# Patient Record
Sex: FEMALE | Race: WHITE | NOT HISPANIC OR LATINO | Employment: UNEMPLOYED | URBAN - METROPOLITAN AREA
[De-identification: names, ages, dates, MRNs, and addresses within clinical notes are randomized per-mention and may not be internally consistent; named-entity substitution may affect disease eponyms.]

---

## 2017-08-07 ENCOUNTER — DOCUMENTATION ONLY (OUTPATIENT)
Dept: HEMATOLOGY/ONCOLOGY | Facility: CLINIC | Age: 22
End: 2017-08-07

## 2017-08-07 NOTE — PROGRESS NOTES
The following pt's appointment has been rescheduled.  Attempted to contact the following pt by phone.  No answer.  Formerly Dr. Delaney's pt. ap

## 2017-08-09 ENCOUNTER — OFFICE VISIT (OUTPATIENT)
Dept: OBSTETRICS AND GYNECOLOGY | Facility: CLINIC | Age: 22
End: 2017-08-09
Payer: MEDICAID

## 2017-08-09 ENCOUNTER — LAB VISIT (OUTPATIENT)
Dept: LAB | Facility: HOSPITAL | Age: 22
End: 2017-08-09
Attending: ADVANCED PRACTICE MIDWIFE
Payer: MEDICAID

## 2017-08-09 VITALS
SYSTOLIC BLOOD PRESSURE: 116 MMHG | DIASTOLIC BLOOD PRESSURE: 70 MMHG | BODY MASS INDEX: 36.57 KG/M2 | HEIGHT: 63 IN | WEIGHT: 206.38 LBS

## 2017-08-09 DIAGNOSIS — Z34.80 SUPERVISION OF OTHER NORMAL PREGNANCY: ICD-10-CM

## 2017-08-09 DIAGNOSIS — I78.0 OSLER-WEBER-RENDU DISEASE: ICD-10-CM

## 2017-08-09 DIAGNOSIS — E66.9 NON MORBID OBESITY, UNSPECIFIED OBESITY TYPE: ICD-10-CM

## 2017-08-09 DIAGNOSIS — Z34.80 SUPERVISION OF OTHER NORMAL PREGNANCY: Primary | ICD-10-CM

## 2017-08-09 LAB
ABO + RH BLD: NORMAL
BASOPHILS # BLD AUTO: 0.02 K/UL
BASOPHILS NFR BLD: 0.2 %
BLD GP AB SCN CELLS X3 SERPL QL: NORMAL
DIFFERENTIAL METHOD: NORMAL
EOSINOPHIL # BLD AUTO: 0.1 K/UL
EOSINOPHIL NFR BLD: 1.3 %
ERYTHROCYTE [DISTWIDTH] IN BLOOD BY AUTOMATED COUNT: 12.8 %
HCT VFR BLD AUTO: 40 %
HGB BLD-MCNC: 13.9 G/DL
LYMPHOCYTES # BLD AUTO: 2.8 K/UL
LYMPHOCYTES NFR BLD: 25.5 %
MCH RBC QN AUTO: 30.9 PG
MCHC RBC AUTO-ENTMCNC: 34.8 G/DL
MCV RBC AUTO: 89 FL
MONOCYTES # BLD AUTO: 0.7 K/UL
MONOCYTES NFR BLD: 6 %
NEUTROPHILS # BLD AUTO: 7.4 K/UL
NEUTROPHILS NFR BLD: 66.8 %
PLATELET # BLD AUTO: 322 K/UL
PMV BLD AUTO: 11.1 FL
RBC # BLD AUTO: 4.5 M/UL
WBC # BLD AUTO: 11.01 K/UL

## 2017-08-09 PROCEDURE — 99999 PR PBB SHADOW E&M-EST. PATIENT-LVL II: CPT | Mod: PBBFAC,,, | Performed by: ADVANCED PRACTICE MIDWIFE

## 2017-08-09 PROCEDURE — 3008F BODY MASS INDEX DOCD: CPT | Mod: ,,, | Performed by: ADVANCED PRACTICE MIDWIFE

## 2017-08-09 PROCEDURE — 86703 HIV-1/HIV-2 1 RESULT ANTBDY: CPT

## 2017-08-09 PROCEDURE — 86762 RUBELLA ANTIBODY: CPT

## 2017-08-09 PROCEDURE — 87340 HEPATITIS B SURFACE AG IA: CPT

## 2017-08-09 PROCEDURE — 86850 RBC ANTIBODY SCREEN: CPT

## 2017-08-09 PROCEDURE — 99203 OFFICE O/P NEW LOW 30 MIN: CPT | Mod: S$PBB,TH,, | Performed by: ADVANCED PRACTICE MIDWIFE

## 2017-08-09 PROCEDURE — 86900 BLOOD TYPING SEROLOGIC ABO: CPT

## 2017-08-09 PROCEDURE — 85025 COMPLETE CBC W/AUTO DIFF WBC: CPT

## 2017-08-09 PROCEDURE — 36415 COLL VENOUS BLD VENIPUNCTURE: CPT

## 2017-08-09 PROCEDURE — 86592 SYPHILIS TEST NON-TREP QUAL: CPT

## 2017-08-09 NOTE — PROGRESS NOTES
Subjective:       Patient ID: Angy Bay is a 21 y.o. female.    Chief Complaint:  Possible Pregnancy      History of Present Illness  HPI  Missed Menses/ Possible Pregnancy  Patient complains of amenorrhea. She believes she could be pregnant. Pregnancy is desired. Sexual Activity: single partner, contraception: none. Current symptoms also include: breast tenderness, morning sickness, nausea and positive home pregnancy test. Last period was normal.     Patient's last menstrual period was 2017.                       GYN & OB HistoryPatient's last menstrual period was 2017.   Date of Last Pap: 2016    OB History    Para Term  AB Living   1 1 1     1   SAB TAB Ectopic Multiple Live Births         0 1      # Outcome Date GA Lbr Sherif/2nd Weight Sex Delivery Anes PTL Lv   1 Term 16 41w0d  3.035 kg (6 lb 11.1 oz) F Vag-Vacuum EPI N JUAN CARLOS      Complications: Oligohydramnios          Review of Systems  Review of Systems   Gastrointestinal: Positive for nausea.   All other systems reviewed and are negative.          Objective:    Physical Exam:   Constitutional: She is oriented to person, place, and time. She appears well-developed and well-nourished.    HENT:   Head: Normocephalic.      Cardiovascular: Normal rate and regular rhythm.     Pulmonary/Chest: Effort normal and breath sounds normal.        Abdominal: Soft. Bowel sounds are normal.   Non-tender     Genitourinary: Vagina normal and uterus normal.               Neurological: She is alert and oriented to person, place, and time.    Skin: Skin is warm and dry.    Psychiatric: She has a normal mood and affect. Her behavior is normal. Judgment and thought content normal.     Speculum for gen probe done; last pap , WNL  Pelvic WNL     Assessment:        1. Osler-Weber-Rendu disease    2. Non morbid obesity, unspecified obesity type       + pregnancy test         Plan:      F/u for NOB exam   Labs ordered  U/s for dating  ordered

## 2017-08-10 LAB
BACTERIA UR CULT: NO GROWTH
C TRACH DNA SPEC QL NAA+PROBE: NOT DETECTED
HBV SURFACE AG SERPL QL IA: NEGATIVE
HIV 1+2 AB+HIV1 P24 AG SERPL QL IA: NEGATIVE
N GONORRHOEA DNA SPEC QL NAA+PROBE: NOT DETECTED
RPR SER QL: NORMAL
RUBV IGG SER-ACNC: 33.6 IU/ML
RUBV IGG SER-IMP: REACTIVE

## 2017-08-28 ENCOUNTER — INITIAL PRENATAL (OUTPATIENT)
Dept: OBSTETRICS AND GYNECOLOGY | Facility: CLINIC | Age: 22
End: 2017-08-28
Payer: MEDICAID

## 2017-08-28 ENCOUNTER — PROCEDURE VISIT (OUTPATIENT)
Dept: OBSTETRICS AND GYNECOLOGY | Facility: CLINIC | Age: 22
End: 2017-08-28
Payer: MEDICAID

## 2017-08-28 VITALS
BODY MASS INDEX: 36.18 KG/M2 | DIASTOLIC BLOOD PRESSURE: 70 MMHG | SYSTOLIC BLOOD PRESSURE: 100 MMHG | WEIGHT: 204.25 LBS

## 2017-08-28 DIAGNOSIS — Z34.80 SUPERVISION OF OTHER NORMAL PREGNANCY: ICD-10-CM

## 2017-08-28 DIAGNOSIS — Z34.91 ENCOUNTER FOR SUPERVISION OF NORMAL PREGNANCY IN FIRST TRIMESTER, UNSPECIFIED GRAVIDITY: Primary | ICD-10-CM

## 2017-08-28 PROCEDURE — 76801 OB US < 14 WKS SINGLE FETUS: CPT | Mod: PBBFAC,PO | Performed by: OBSTETRICS & GYNECOLOGY

## 2017-08-28 PROCEDURE — 99999 PR PBB SHADOW E&M-EST. PATIENT-LVL II: CPT | Mod: PBBFAC,,, | Performed by: ADVANCED PRACTICE MIDWIFE

## 2017-08-28 PROCEDURE — 3008F BODY MASS INDEX DOCD: CPT | Mod: ,,, | Performed by: ADVANCED PRACTICE MIDWIFE

## 2017-08-28 PROCEDURE — 99212 OFFICE O/P EST SF 10 MIN: CPT | Mod: PBBFAC,PO | Performed by: ADVANCED PRACTICE MIDWIFE

## 2017-08-28 PROCEDURE — 76801 OB US < 14 WKS SINGLE FETUS: CPT | Mod: 26,S$PBB,, | Performed by: OBSTETRICS & GYNECOLOGY

## 2017-08-28 PROCEDURE — 99214 OFFICE O/P EST MOD 30 MIN: CPT | Mod: TH,S$PBB,, | Performed by: ADVANCED PRACTICE MIDWIFE

## 2017-08-28 NOTE — PROGRESS NOTES
Pt here for new ob visit  Reoriented to the practice including MICHI/MD collaboration  Reviewed labs  Introduced to Coffective bowen  Blue bag materials reviewed  Warning signs discussed.

## 2017-10-10 ENCOUNTER — ROUTINE PRENATAL (OUTPATIENT)
Dept: OBSTETRICS AND GYNECOLOGY | Facility: CLINIC | Age: 22
End: 2017-10-10
Payer: MEDICAID

## 2017-10-10 VITALS
DIASTOLIC BLOOD PRESSURE: 70 MMHG | WEIGHT: 206.81 LBS | SYSTOLIC BLOOD PRESSURE: 122 MMHG | BODY MASS INDEX: 36.63 KG/M2

## 2017-10-10 DIAGNOSIS — Z3A.16 16 WEEKS GESTATION OF PREGNANCY: ICD-10-CM

## 2017-10-10 DIAGNOSIS — Z36.3 ANTENATAL SCREENING FOR MALFORMATION USING ULTRASONICS: ICD-10-CM

## 2017-10-10 DIAGNOSIS — O99.212 OBESITY DURING PREGNANCY IN SECOND TRIMESTER: Primary | ICD-10-CM

## 2017-10-10 DIAGNOSIS — O21.9 NAUSEA AND VOMITING IN PREGNANCY PRIOR TO 22 WEEKS GESTATION: ICD-10-CM

## 2017-10-10 DIAGNOSIS — I78.0 OSLER-WEBER-RENDU DISEASE: ICD-10-CM

## 2017-10-10 PROCEDURE — 99999 PR PBB SHADOW E&M-EST. PATIENT-LVL II: CPT | Mod: PBBFAC,,, | Performed by: MIDWIFE

## 2017-10-10 PROCEDURE — 99212 OFFICE O/P EST SF 10 MIN: CPT | Mod: PBBFAC | Performed by: MIDWIFE

## 2017-10-10 PROCEDURE — 99212 OFFICE O/P EST SF 10 MIN: CPT | Mod: TH,S$PBB,, | Performed by: MIDWIFE

## 2017-10-10 RX ORDER — ONDANSETRON 8 MG/1
8 TABLET, ORALLY DISINTEGRATING ORAL EVERY 12 HOURS PRN
Qty: 10 TABLET | Refills: 0 | Status: SHIPPED | OUTPATIENT
Start: 2017-10-10 | End: 2018-01-24

## 2017-10-10 NOTE — PROGRESS NOTES
Complaints today: nausea and vomiting    /70   Wt 93.8 kg (206 lb 12.7 oz)   LMP 2017   BMI 36.63 kg/m²     21 y.o., at 16w4d by Estimated Date of Delivery: 3/23/18  Patient Active Problem List   Diagnosis    Obesity    Osler-Weber-Rendu disease    Obesity during pregnancy in second trimester     OB History    Para Term  AB Living   2 1 1     1   SAB TAB Ectopic Multiple Live Births         0 1      # Outcome Date GA Lbr Sherif/2nd Weight Sex Delivery Anes PTL Lv   2 Current            1 Term 16 41w0d  3.035 kg (6 lb 11.1 oz) F Vag-Vacuum EPI N JUAN CARLOS      Complications: Oligohydramnios          Dating reviewed    Allergies and problem list reviewed and updated    Medical and surgical history reviewed    Prenatal labs reviewed and updated    Physical Exam:  ABD: soft, gravid, nontender, obese    Assessment:  Obesity in pregnancy  Osler Hammond Rendu disease    Plan:   US for anatomy at [facility]  Zofran for nausea   follow up 4 Weeks

## 2017-11-04 ENCOUNTER — NURSE TRIAGE (OUTPATIENT)
Dept: ADMINISTRATIVE | Facility: CLINIC | Age: 22
End: 2017-11-04

## 2017-11-04 NOTE — TELEPHONE ENCOUNTER
"17 wks preg   VM not set up at 1158am    Reason for Disposition   Patient sounds very sick or weak to the triager    Answer Assessment - Initial Assessment Questions  1. LOCATION: "Where does it hurt?"      Sx started 10/31 now with HA, ST, chest hurting- hurts to take deep breath, afeb   2. ONSET: "When did the headache start?" (Minutes, hours or days)      10/31  3. PATTERN: "Does the pain come and go, or has it been constant since it started?"      Today is constant   4. SEVERITY: "How bad is the pain?" and "What does it keep you from doing?"     - MILD - doesn't interfere with normal activities     - MODERATE - interferes with normal activities or awakens from sleep     - SEVERE - excruciating pain, unable to do any normal activities        6   5. RECURRENT SYMPTOM: "Have you ever had headaches before?" If so, ask: "When was the last time?" and "What happened that time?"      N/a   6. CAUSE: "What do you think is causing the headache?"     Cold sx   7. MIGRAINE: "Have you been diagnosed with migraine headaches?" If so, ask: "Is this headache similar?"    hx HA   8. HEAD INJURY: "Has there been any recent injury to the head?"      No   9. OTHER SYMPTOMS: "Do you have any other symptoms?" (e.g., fever, stiff neck, blurred vision; swelling of hands, face, or feet)     Glands in neck swollen   10. PREGNANCY: "How many weeks pregnant are you?"       17 wks   11. TORSTEN: "What date are you expecting to deliver?"       3/23/2018    Protocols used:  PREGNANCY - HEADACHE-A-AH  rec ED due to pain with deep breath, HA, ST. Call back with questions.     "

## 2017-11-06 ENCOUNTER — TELEPHONE (OUTPATIENT)
Dept: OBSTETRICS AND GYNECOLOGY | Facility: CLINIC | Age: 22
End: 2017-11-06

## 2017-11-07 NOTE — TELEPHONE ENCOUNTER
Spoke to patient and she stated that she went to doctor and had the flu.  Medication given and patient feeling much better.

## 2017-11-13 ENCOUNTER — PROCEDURE VISIT (OUTPATIENT)
Dept: OBSTETRICS AND GYNECOLOGY | Facility: CLINIC | Age: 22
End: 2017-11-13
Payer: MEDICAID

## 2017-11-13 ENCOUNTER — ROUTINE PRENATAL (OUTPATIENT)
Dept: OBSTETRICS AND GYNECOLOGY | Facility: CLINIC | Age: 22
End: 2017-11-13
Payer: MEDICAID

## 2017-11-13 VITALS
DIASTOLIC BLOOD PRESSURE: 72 MMHG | WEIGHT: 208.13 LBS | SYSTOLIC BLOOD PRESSURE: 116 MMHG | BODY MASS INDEX: 36.87 KG/M2

## 2017-11-13 DIAGNOSIS — Z34.82 ENCOUNTER FOR SUPERVISION OF OTHER NORMAL PREGNANCY IN SECOND TRIMESTER: ICD-10-CM

## 2017-11-13 DIAGNOSIS — Z36.3 ANTENATAL SCREENING FOR MALFORMATION USING ULTRASONICS: ICD-10-CM

## 2017-11-13 PROCEDURE — 76805 OB US >/= 14 WKS SNGL FETUS: CPT | Mod: PBBFAC,PO | Performed by: OBSTETRICS & GYNECOLOGY

## 2017-11-13 PROCEDURE — 99213 OFFICE O/P EST LOW 20 MIN: CPT | Mod: TH,S$PBB,, | Performed by: ADVANCED PRACTICE MIDWIFE

## 2017-11-13 PROCEDURE — 99212 OFFICE O/P EST SF 10 MIN: CPT | Mod: PBBFAC,PO | Performed by: ADVANCED PRACTICE MIDWIFE

## 2017-11-13 PROCEDURE — 99999 PR PBB SHADOW E&M-EST. PATIENT-LVL II: CPT | Mod: PBBFAC,,, | Performed by: ADVANCED PRACTICE MIDWIFE

## 2017-11-13 PROCEDURE — 76805 OB US >/= 14 WKS SNGL FETUS: CPT | Mod: 26,S$PBB,, | Performed by: OBSTETRICS & GYNECOLOGY

## 2017-11-13 NOTE — PROGRESS NOTES
Doing well  Anatomy ultrasound today, suboptimal  Questions answered    Coffective counseling sheet Get Ready discussed with mother. Reinforced avoiding induction of labor unless medically indicated as well as comfort measures during labor.  Encouraged mother to download Coffective mobile bowen if she has not already done so. Mother verbalizes understanding.

## 2017-11-14 ENCOUNTER — TELEPHONE (OUTPATIENT)
Dept: OBSTETRICS AND GYNECOLOGY | Facility: CLINIC | Age: 22
End: 2017-11-14

## 2017-11-14 DIAGNOSIS — I78.0 OSLER-WEBER-RENDU DISEASE: Primary | ICD-10-CM

## 2017-11-14 NOTE — TELEPHONE ENCOUNTER
Pt will need to see MFM secondary to Osler Hammond Rendu disease. Please schedule and notify pt (Routing comment)    Patient notified and appointment scheduled.

## 2017-12-06 ENCOUNTER — OFFICE VISIT (OUTPATIENT)
Dept: OBSTETRICS AND GYNECOLOGY | Facility: CLINIC | Age: 22
End: 2017-12-06
Payer: MEDICAID

## 2017-12-06 DIAGNOSIS — I78.0 OSLER-WEBER-RENDU DISEASE: ICD-10-CM

## 2017-12-06 PROCEDURE — 99213 OFFICE O/P EST LOW 20 MIN: CPT | Mod: GT,S$PBB,25,TH | Performed by: OBSTETRICS & GYNECOLOGY

## 2017-12-06 PROCEDURE — 76811 OB US DETAILED SNGL FETUS: CPT | Mod: 26,S$PBB,, | Performed by: OBSTETRICS & GYNECOLOGY

## 2017-12-06 PROCEDURE — 76811 OB US DETAILED SNGL FETUS: CPT | Mod: PBBFAC,PO | Performed by: OBSTETRICS & GYNECOLOGY

## 2017-12-06 NOTE — PROGRESS NOTES
Chief complaint: HHT- Osler-Hammond Rendu syndrome    21 y.o. [id number] 24w5d EGA.    PMH:  Past Medical History:   Diagnosis Date    Osler-Weber-Rendu disease        PObHx:  OB History    Para Term  AB Living   2 1 1     1   SAB TAB Ectopic Multiple Live Births         0 1      # Outcome Date GA Lbr Sherif/2nd Weight Sex Delivery Anes PTL Lv   2 Current            1 Term 16 41w0d  3.035 kg (6 lb 11.1 oz) F Vag-Vacuum EPI N JUAN CARLOS      Complications: Oligohydramnios          PSH:  Past Surgical History:   Procedure Laterality Date    TONSILLECTOMY, ADENOIDECTOMY         Family history:family history is not on file.    Social history: reports that she has been smoking.  She has never used smokeless tobacco. She reports that she uses drugs. She reports that she does not drink alcohol.    A detailed fetal anatomical ultrasound was completed today.  See details in imaging section of EPIC.    Hereditary hemorrhagic telangiectasia (HHT; also called Osler-Weber-Rendu syndrome) is an autosomal dominant vascular disorder with a variety of clinical manifestations. Among the most common are epistaxis, gastrointestinal bleeding, and iron deficiency anemia, along with characteristic mucocutaneous telangiectasia. In addition, arteriovenous malformations (AVMs) commonly occur in the pulmonary, hepatic, and cerebral circulations, demanding knowledge of the risks and benefits of screening and treatment of patients with these complications.  The patient has had an MRI which did not demonstrate any intracerebral AVMs.  She was supposed to have a pulmonary arteriogram after her last pregnancy but did not have this done.  She has been asymptomatic outside of her epistaxis and bleeding with her delivery.      In a series of 484 pregnancies in 199 pregnant women the vast majority are able to have a normal pregnancy. That said, a small proportion of women did experience life-threatening complications, even in those who  "previously had only minor symptoms of HHT.  In this series, the following complications were noted:  Pulmonary AVM hemorrhage (PAVM) - 1.4 percent (95% CI 0.2-2.5)  Stroke - 1.2 percent (95% CI 0.3-2.2)  Maternal deaths - 1.0 percent (95% CI 0.1-1.9)  Myocardial infarction - 0.2 percent (one case)    Recommendations for the management of pregnant women with HHT include:  All pregnancies in women with HHT should be considered "high risk," and local obstetric services should be alerted to the need for greater than normal vigilance and pre-planning.  Since spinal AVMs affect approximately 1 to 2 percent of HHT patients, many anesthetists will not perform epidural analgesia in HHT mothers unless MRI scans have excluded this possibility.  She had an uncomplicated epidural in her last pregnancy.  PAVMs will enlarge during pregnancy, and fatal hemorrhage from maternal PAVMs has been described. As a result, women with HHT should be screened for PAVMs and treated maximally before pregnancy, although treatment may be safely undertaken in late pregnancy if require. Hemoptysis of any degree or sudden severe dyspnea should be considered a medical emergency, prompting urgent hospitalization and institution of appropriate treatment.  A prolonged second stage of labor should be avoided in women in whom cerebral AVMs have not been excluded. In some countries, it is assumed that cerebral AVMs may be present, and this advice is given to all women with HHT. In others, or if there would be specific separate recommendations were a cerebral AVM to be found, cerebral MRI is performed in pregnancy.  In keeping with the general advice for patients with PAVMs and/or HHT, antibiotic prophylaxis should be provided during delivery.  Anecdotal data suggest that epistaxis may get worse and skin telangiectasia become more prominent during pregnancy; there are no firm data regarding effects on hepatic or cerebral AVMs.     She had questions with " regard to vaginal delivery vs.  because she had a difficult time with her delivery in her prior pregnancy.  By her history this was primarily because she did not dialate after Cytotec, cook balloon and pitocin.  She had a multi day induction but finally delivered vaginal by vacuum extraction. I explained to her that primigravidas normally did not dilate until labor but that many mutips dilated a month before term making for a much easier time. I woulddelay this decision until later in the 3rd trimester.

## 2017-12-19 ENCOUNTER — ROUTINE PRENATAL (OUTPATIENT)
Dept: OBSTETRICS AND GYNECOLOGY | Facility: CLINIC | Age: 22
End: 2017-12-19
Payer: MEDICAID

## 2017-12-19 VITALS
DIASTOLIC BLOOD PRESSURE: 60 MMHG | SYSTOLIC BLOOD PRESSURE: 110 MMHG | WEIGHT: 209.88 LBS | BODY MASS INDEX: 37.18 KG/M2

## 2017-12-19 DIAGNOSIS — Z34.82 ENCOUNTER FOR SUPERVISION OF OTHER NORMAL PREGNANCY IN SECOND TRIMESTER: Primary | ICD-10-CM

## 2017-12-19 PROCEDURE — 99213 OFFICE O/P EST LOW 20 MIN: CPT | Mod: TH,S$PBB,, | Performed by: ADVANCED PRACTICE MIDWIFE

## 2017-12-19 PROCEDURE — 99999 PR PBB SHADOW E&M-EST. PATIENT-LVL II: CPT | Mod: PBBFAC,,, | Performed by: ADVANCED PRACTICE MIDWIFE

## 2017-12-19 PROCEDURE — 99212 OFFICE O/P EST SF 10 MIN: CPT | Mod: PBBFAC,PO | Performed by: ADVANCED PRACTICE MIDWIFE

## 2017-12-19 NOTE — PROGRESS NOTES
Doing well  PTL talk  28 week labs next visit    Coffective counseling sheet Keep Baby Close discussed with mother. Reinforced rooming in practices, continued skin to skin, and quiet hours as requested by mother.  Encouraged mother to download Coffective mobile bowen if she has not already done so. Mother verbalizes understanding.

## 2018-01-02 ENCOUNTER — ROUTINE PRENATAL (OUTPATIENT)
Dept: OBSTETRICS AND GYNECOLOGY | Facility: CLINIC | Age: 23
End: 2018-01-02
Payer: MEDICAID

## 2018-01-02 ENCOUNTER — LAB VISIT (OUTPATIENT)
Dept: LAB | Facility: HOSPITAL | Age: 23
End: 2018-01-02
Attending: ADVANCED PRACTICE MIDWIFE
Payer: MEDICAID

## 2018-01-02 VITALS
SYSTOLIC BLOOD PRESSURE: 116 MMHG | DIASTOLIC BLOOD PRESSURE: 80 MMHG | WEIGHT: 214.94 LBS | BODY MASS INDEX: 38.08 KG/M2

## 2018-01-02 DIAGNOSIS — Z34.83 ENCOUNTER FOR SUPERVISION OF OTHER NORMAL PREGNANCY IN THIRD TRIMESTER: Primary | ICD-10-CM

## 2018-01-02 DIAGNOSIS — Z34.82 ENCOUNTER FOR SUPERVISION OF OTHER NORMAL PREGNANCY IN SECOND TRIMESTER: ICD-10-CM

## 2018-01-02 DIAGNOSIS — O99.213 OBESITY AFFECTING PREGNANCY IN THIRD TRIMESTER: ICD-10-CM

## 2018-01-02 LAB
BASOPHILS # BLD AUTO: 0.02 K/UL
BASOPHILS NFR BLD: 0.2 %
DIFFERENTIAL METHOD: ABNORMAL
EOSINOPHIL # BLD AUTO: 0.1 K/UL
EOSINOPHIL NFR BLD: 1.2 %
ERYTHROCYTE [DISTWIDTH] IN BLOOD BY AUTOMATED COUNT: 12.5 %
GLUCOSE SERPL-MCNC: 131 MG/DL
HCT VFR BLD AUTO: 36.8 %
HGB BLD-MCNC: 12.5 G/DL
IMM GRANULOCYTES # BLD AUTO: 0.04 K/UL
IMM GRANULOCYTES NFR BLD AUTO: 0.4 %
LYMPHOCYTES # BLD AUTO: 2.2 K/UL
LYMPHOCYTES NFR BLD: 22.5 %
MCH RBC QN AUTO: 30.8 PG
MCHC RBC AUTO-ENTMCNC: 34 G/DL
MCV RBC AUTO: 91 FL
MONOCYTES # BLD AUTO: 0.3 K/UL
MONOCYTES NFR BLD: 3.5 %
NEUTROPHILS # BLD AUTO: 7 K/UL
NEUTROPHILS NFR BLD: 72.2 %
NRBC BLD-RTO: 0 /100 WBC
PLATELET # BLD AUTO: 252 K/UL
PMV BLD AUTO: 10.7 FL
RBC # BLD AUTO: 4.06 M/UL
WBC # BLD AUTO: 9.65 K/UL

## 2018-01-02 PROCEDURE — 36415 COLL VENOUS BLD VENIPUNCTURE: CPT | Mod: PO

## 2018-01-02 PROCEDURE — 99212 OFFICE O/P EST SF 10 MIN: CPT | Mod: PBBFAC,PO | Performed by: ADVANCED PRACTICE MIDWIFE

## 2018-01-02 PROCEDURE — 82950 GLUCOSE TEST: CPT

## 2018-01-02 PROCEDURE — 99213 OFFICE O/P EST LOW 20 MIN: CPT | Mod: TH,S$PBB,, | Performed by: ADVANCED PRACTICE MIDWIFE

## 2018-01-02 PROCEDURE — 85025 COMPLETE CBC W/AUTO DIFF WBC: CPT

## 2018-01-02 PROCEDURE — 86703 HIV-1/HIV-2 1 RESULT ANTBDY: CPT

## 2018-01-02 PROCEDURE — 86592 SYPHILIS TEST NON-TREP QUAL: CPT

## 2018-01-02 PROCEDURE — 99999 PR PBB SHADOW E&M-EST. PATIENT-LVL II: CPT | Mod: PBBFAC,,, | Performed by: ADVANCED PRACTICE MIDWIFE

## 2018-01-02 NOTE — PROGRESS NOTES
Doing well  28 week labs today  Desires TDAP next visit  PTL talk  Questions answered  Had M appt. No new recommendations at this time

## 2018-01-03 LAB
HIV 1+2 AB+HIV1 P24 AG SERPL QL IA: NEGATIVE
RPR SER QL: NORMAL

## 2018-01-10 ENCOUNTER — OFFICE VISIT (OUTPATIENT)
Dept: OBSTETRICS AND GYNECOLOGY | Facility: CLINIC | Age: 23
End: 2018-01-10
Payer: MEDICAID

## 2018-01-10 DIAGNOSIS — I78.0 OSLER-WEBER-RENDU DISEASE: Primary | ICD-10-CM

## 2018-01-10 DIAGNOSIS — Z36.89 ENCOUNTER FOR ULTRASOUND TO CHECK FETAL GROWTH: ICD-10-CM

## 2018-01-10 DIAGNOSIS — O99.212 OBESITY DURING PREGNANCY IN SECOND TRIMESTER: ICD-10-CM

## 2018-01-10 PROCEDURE — 99212 OFFICE O/P EST SF 10 MIN: CPT | Mod: TG,S$PBB,TH,25 | Performed by: OBSTETRICS & GYNECOLOGY

## 2018-01-10 PROCEDURE — 76816 OB US FOLLOW-UP PER FETUS: CPT | Mod: TG,PBBFAC,PO | Performed by: OBSTETRICS & GYNECOLOGY

## 2018-01-10 PROCEDURE — 76816 OB US FOLLOW-UP PER FETUS: CPT | Mod: 26,TG,S$PBB, | Performed by: OBSTETRICS & GYNECOLOGY

## 2018-01-10 NOTE — PROGRESS NOTES
Indication  ========    Evaluation of fetal growth.    Method  ======    Transabdominal ultrasound examination. View: Good view.    Pregnancy  =========    Griffith pregnancy. Number of fetuses: 1.    Dating  ======    LMP on: 6/16/2017  GA by LMP 29 w + 5 d  TORSTEN by LMP: 3/23/2018  Ultrasound examination on: 1/10/2018  GA by U/S based upon: AC, BPD, Femur, HC  GA by U/S 29 w + 0 d  TORSTEN by U/S: 3/28/2018  Assigned: Dating performed on 08/28/2017, based on the LMP  Assigned GA 29 w + 5 d  Assigned TORSTEN: 3/23/2018    General Evaluation  ==============    Cardiac activity: present.  bpm.  Fetal movements: visualized.  Presentation: breech.  Placenta: posterior.  Amniotic fluid: normal amountMVP 5.7 cm. SHREYA 16.4 cm. Q1 5.7 cm, Q2 1.7 cm, Q3 4.7 cm, Q4 4.3 cm.    Fetal Biometry  ============    Fetal Biometry  BPD 69.2 mm 27w 6d Hadlock  OFD 93.2 mm 30w 0d Gallito  .8 mm 29w 5d Hadlock  .1 mm 30w 0d Hadlock  Femur 53.5 mm 28w 3d Hadlock  EFW 1,364 g 31% Leandro  Calculated by: Hadlock (BPD-HC-AC-FL)  EFW (lb) 3 lb  EFW (oz) 0 oz  Cephalic index 0.74  HC / AC 1.05  FL / BPD 0.77  FL / AC 0.21  MVP 5.7 cm  SHREYA 16.4 cm   bpm    Fetal Anatomy  ===========    Cranium: normal  Lateral ventricles: normal  Choroid plexus: documented previously  Cavum septi pellucidi: documented previously  Cerebellum: documented previously  Cisterna magna: documented previously  Lips: normal  Profile: suboptimal  Nose: normal  4-chamber view: normal  RVOT: documented previously  LVOT: normal  Situs: normal  Aortic arch: normal  Cardiac position: normal  Cardiac axis: normal  Cardiac proportions: normal  Cardiac rhythm: normal  Cardiac function: normal  Rt lung: normal  Lt lung: normal  Diaphragm: normal  Cord insertion: documented previously  Stomach: normal  Kidneys: normal  Bladder: documented previously  Cervical spine: documented previously  Thoracic spine: documented previously  Lumbar spine: documented  previously  Sacral spine: documented previously  Arms: documented previously  Legs: documented previously  Gender: female  Wants to know gender: yes    Consultation  ==========    Type: see EPIC consult note.    Impression  =========    Griffith live intrauterine pregnancy.  Overall normal fetal growth.  Normal amniotic fluid volume.  LVOT was previously suboptimally visualized and appears normal today. The remainder of the limited fetal anatomy appears normal.    Recommendation  ==============    Follow up ultrasound for growth in 5 weeks.  see epic consult note.

## 2018-01-10 NOTE — PROGRESS NOTES
Telemedicine [facility] Ultrasound Note      Consultation started: 1/10/2018 at 2:50PM   The chief complaint leading to consultation is: Follow up for Osler Weber Rendu  The patient location is: [place]  The patient arrived at: 230pm  Spoke nurse at bedside with patient assisting consultant. Also present with the patient at the time of the consultation: no additional individuals    The patient reports that her Osler Weber Rendu is stable. She has nosebleeds which she has outside of pregnancy that have not changed. She has no pulmonary symptoms. She had an MRI negative for AVM in the past but showed possible old cortical infarct. She did not go for a pulmonary CT after her last pregnancy. She did see Genetics at one point but did not return to see them for testing. She indicated she has transportation issues and may pursue the testing in the future. She is aware of the potential 50% risk that each of her offspring have this and I recommend her pediatricians be aware of her diagnosis. I would also recommend anesthesia consultation as at times spinal MRI is desired prior to epidural but other literature does not necessarily support this. I would avoid NSAIDS. There is risk in pregnancy of pulmonary hemorrhage and air embolism and other adverse outcomes. Be sure appropriate IV supplies/filters are used. Please see Dr. Ivory's consult for detailed recommendations. If any pulmonary symptoms would need further workup for pulmonary hypertension or AVM. Antibiotic prophylaxis is recommended in accordance with the recommendations of the AHA for endocarditis prophylaxis. Recommend follow up ultrasound for fetal growth in 5 weeks.            Consultation ended: 1/10/2018 at 300 pm.    Total time spent with patient: 10 minutes was spent in face to face time with greater than half of that time spent in counseling and coordination of care.  Consulting clinician was informed of the encounter and consult note.

## 2018-01-24 ENCOUNTER — ROUTINE PRENATAL (OUTPATIENT)
Dept: OBSTETRICS AND GYNECOLOGY | Facility: CLINIC | Age: 23
End: 2018-01-24
Payer: MEDICAID

## 2018-01-24 VITALS — BODY MASS INDEX: 38.97 KG/M2 | WEIGHT: 220 LBS | SYSTOLIC BLOOD PRESSURE: 122 MMHG | DIASTOLIC BLOOD PRESSURE: 78 MMHG

## 2018-01-24 DIAGNOSIS — Z23 NEED FOR DIPHTHERIA-TETANUS-PERTUSSIS (TDAP) VACCINE: ICD-10-CM

## 2018-01-24 DIAGNOSIS — O09.93 SUPERVISION OF HIGH RISK PREGNANCY IN THIRD TRIMESTER: Primary | ICD-10-CM

## 2018-01-24 PROCEDURE — 90471 IMMUNIZATION ADMIN: CPT | Mod: PBBFAC,PO

## 2018-01-24 PROCEDURE — 99213 OFFICE O/P EST LOW 20 MIN: CPT | Mod: TH,S$PBB,, | Performed by: ADVANCED PRACTICE MIDWIFE

## 2018-01-24 PROCEDURE — 99999 PR PBB SHADOW E&M-EST. PATIENT-LVL II: CPT | Mod: PBBFAC,,, | Performed by: ADVANCED PRACTICE MIDWIFE

## 2018-01-24 PROCEDURE — 99212 OFFICE O/P EST SF 10 MIN: CPT | Mod: PBBFAC,TH,PO | Performed by: ADVANCED PRACTICE MIDWIFE

## 2018-01-24 NOTE — PROGRESS NOTES
Doing well, good fetal movement. Denies contractions.   Seeing MFM on 2/14, reviewed scan from last visit and recommendations.   Pt plans epidural, bottle feeding and IUD for birth control, brochures given for [employer] [employer].   TDAP today. Declines flu shot    Coffective counseling sheet Build Your Team discussed with mother. Reinforced importance of early identification of support team including champion, OB provider, pediatrician and local community resources. Encouraged mother to download Coffective mobile bowen if she has not already done so.  Mother verbalizes understanding.

## 2018-02-14 ENCOUNTER — OFFICE VISIT (OUTPATIENT)
Dept: OBSTETRICS AND GYNECOLOGY | Facility: CLINIC | Age: 23
End: 2018-02-14
Payer: MEDICAID

## 2018-02-14 DIAGNOSIS — I78.0 OSLER-WEBER-RENDU DISEASE: Primary | ICD-10-CM

## 2018-02-14 PROCEDURE — 99212 OFFICE O/P EST SF 10 MIN: CPT | Mod: TH,25,S$PBB, | Performed by: OBSTETRICS & GYNECOLOGY

## 2018-02-14 PROCEDURE — 3008F BODY MASS INDEX DOCD: CPT | Mod: ,,, | Performed by: OBSTETRICS & GYNECOLOGY

## 2018-02-14 PROCEDURE — 76819 FETAL BIOPHYS PROFIL W/O NST: CPT | Mod: 26,GT,S$PBB, | Performed by: OBSTETRICS & GYNECOLOGY

## 2018-02-14 PROCEDURE — 76816 OB US FOLLOW-UP PER FETUS: CPT | Mod: GT,PBBFAC,PO | Performed by: OBSTETRICS & GYNECOLOGY

## 2018-02-14 PROCEDURE — 76819 FETAL BIOPHYS PROFIL W/O NST: CPT | Mod: GT,PBBFAC,PO | Performed by: OBSTETRICS & GYNECOLOGY

## 2018-02-14 PROCEDURE — 76816 OB US FOLLOW-UP PER FETUS: CPT | Mod: 26,GT,S$PBB, | Performed by: OBSTETRICS & GYNECOLOGY

## 2018-02-15 ENCOUNTER — ROUTINE PRENATAL (OUTPATIENT)
Dept: OBSTETRICS AND GYNECOLOGY | Facility: CLINIC | Age: 23
End: 2018-02-15
Payer: MEDICAID

## 2018-02-15 VITALS
BODY MASS INDEX: 39.76 KG/M2 | SYSTOLIC BLOOD PRESSURE: 130 MMHG | WEIGHT: 224.44 LBS | DIASTOLIC BLOOD PRESSURE: 82 MMHG

## 2018-02-15 DIAGNOSIS — Z34.83 ENCOUNTER FOR SUPERVISION OF OTHER NORMAL PREGNANCY IN THIRD TRIMESTER: Primary | ICD-10-CM

## 2018-02-15 PROCEDURE — 99999 PR PBB SHADOW E&M-EST. PATIENT-LVL II: CPT | Mod: PBBFAC,,, | Performed by: ADVANCED PRACTICE MIDWIFE

## 2018-02-15 PROCEDURE — 99213 OFFICE O/P EST LOW 20 MIN: CPT | Mod: TH,S$PBB,, | Performed by: ADVANCED PRACTICE MIDWIFE

## 2018-02-15 PROCEDURE — 99212 OFFICE O/P EST SF 10 MIN: CPT | Mod: PBBFAC,TH | Performed by: ADVANCED PRACTICE MIDWIFE

## 2018-02-15 PROCEDURE — 3008F BODY MASS INDEX DOCD: CPT | Mod: ,,, | Performed by: ADVANCED PRACTICE MIDWIFE

## 2018-02-15 NOTE — PROGRESS NOTES
Telemedicine [facility] Ultrasound Note      Consultation started: 18 at 2:40 PM   The chief complaint leading to consultation is: Osler-Hammond Rendu  The patient location is: [facility]  The patient arrived at: 1:40  Spoke nurse at bedside with patient assisting consultant. Also present with the patient at the time of the consultation: .    F/u visit at 34+5  Chart reviewed and previous notes from medical genetics and heme/onc reviewed. Patient had uncomplicated VAVD with prior delivery per recommendations from [facility] for shortened second stage. MRI brain was negative for AVMs. I do not see any evidence of pulmonary or spinal imaging. We discussed the results of today's ultrasound. We discussed the recommendations for a repeat fetal growth ultrasound in three weeks due to fetal growth in the lower range of normal. Additionally, we discussed the need for an antepartum consultation with anesthesia to ensure that no additional imaging is needed prior to delivery. Her brain MRI was negative for AVMs; however, some physicians prefer imaging of the spinal cord as well. Postpartum, the patient should have a CT scan of the lungs to evaluate for AVMs. Per the chart, she has never had hemoptysis. Avoiding prolonged maternal expulsive efforts is reasonable; however, in the absence of cerebral AVMs, I do not necessarily think that a  is mandatory.  Will see the patient back in three weeks.          Consultation ended: 2/15/2018 at 3:00PM.    Total time spent with patient: < 30 Minutes    Consulting clinician was informed of the encounter and consult note.

## 2018-02-27 ENCOUNTER — ROUTINE PRENATAL (OUTPATIENT)
Dept: OBSTETRICS AND GYNECOLOGY | Facility: CLINIC | Age: 23
End: 2018-02-27
Payer: MEDICAID

## 2018-02-27 VITALS
WEIGHT: 223.75 LBS | SYSTOLIC BLOOD PRESSURE: 134 MMHG | DIASTOLIC BLOOD PRESSURE: 80 MMHG | BODY MASS INDEX: 39.64 KG/M2

## 2018-02-27 DIAGNOSIS — Z34.83 ENCOUNTER FOR SUPERVISION OF OTHER NORMAL PREGNANCY IN THIRD TRIMESTER: Primary | ICD-10-CM

## 2018-02-27 PROCEDURE — 99212 OFFICE O/P EST SF 10 MIN: CPT | Mod: PBBFAC,TH,PO | Performed by: ADVANCED PRACTICE MIDWIFE

## 2018-02-27 PROCEDURE — 99213 OFFICE O/P EST LOW 20 MIN: CPT | Mod: TH,S$PBB,, | Performed by: ADVANCED PRACTICE MIDWIFE

## 2018-02-27 PROCEDURE — 99999 PR PBB SHADOW E&M-EST. PATIENT-LVL II: CPT | Mod: PBBFAC,,, | Performed by: ADVANCED PRACTICE MIDWIFE

## 2018-02-27 PROCEDURE — 3008F BODY MASS INDEX DOCD: CPT | Mod: ,,, | Performed by: ADVANCED PRACTICE MIDWIFE

## 2018-02-27 PROCEDURE — 87081 CULTURE SCREEN ONLY: CPT

## 2018-02-27 NOTE — PROGRESS NOTES
Doing well.   MFM appointment next week  Will schedule with MD next week to review chart  GBS collected

## 2018-03-02 LAB — BACTERIA SPEC AEROBE CULT: NORMAL

## 2018-03-05 ENCOUNTER — PROCEDURE VISIT (OUTPATIENT)
Dept: OBSTETRICS AND GYNECOLOGY | Facility: CLINIC | Age: 23
End: 2018-03-05
Payer: MEDICAID

## 2018-03-05 DIAGNOSIS — I78.0 OSLER-WEBER-RENDU DISEASE: ICD-10-CM

## 2018-03-05 PROCEDURE — 76816 OB US FOLLOW-UP PER FETUS: CPT | Mod: PBBFAC | Performed by: OBSTETRICS & GYNECOLOGY

## 2018-03-05 PROCEDURE — 76816 OB US FOLLOW-UP PER FETUS: CPT | Mod: 26,S$PBB,, | Performed by: OBSTETRICS & GYNECOLOGY

## 2018-03-05 PROCEDURE — 99213 OFFICE O/P EST LOW 20 MIN: CPT | Mod: 25,TH,S$PBB, | Performed by: OBSTETRICS & GYNECOLOGY

## 2018-03-05 NOTE — PROGRESS NOTES
Indication  ========    Evaluation of fetal growth.    Method  ======    Transabdominal ultrasound examination. View: Good view.    Pregnancy  =========    Griffith pregnancy. Number of fetuses: 1.    Dating  ======    LMP on: 6/16/2017  GA by LMP 37 w + 3 d  TORSTEN by LMP: 3/23/2018  Ultrasound examination on: 3/5/2018  GA by U/S based upon: AC, BPD, Femur, HC  GA by U/S 35 w + 4 d  TORSTEN by U/S: 4/5/2018  Assigned: Dating performed on 08/28/2017, based on the LMP  Assigned GA 37 w + 3 d  Assigned TORSTEN: 3/23/2018    General Evaluation  ==============    Cardiac activity: present.  bpm.  Fetal movements: visualized.  Presentation: cephalic.  Placenta: posterior.  Amniotic fluid: MVP 4.4 cm. SHREYA 7.1 cm. Q1 4.4 cm, Q2 2.7 cm, Q3 0.0 cm, Q4 0.0 cm.    Fetal Biometry  ============    Fetal Biometry  BPD 84.8 mm 34w 1d Hadlock  .4 mm  .8 mm 35w 6d Hadlock  .2 mm 37w 3d Hadlock  Femur 67.5 mm 34w 5d Hadlock  Humerus 59.2 mm 34w 2d Gallito  EFW 2,880 g 25% Leandro  Calculated by: Hadlock (BPD-HC-AC-FL)  EFW (lb) 6 lb  EFW (oz) 6 oz  Cephalic index 0.73  HC / AC 0.95  FL / BPD 0.80  FL / AC 0.20  MVP 4.4 cm  SHREYA 7.1 cm   bpm    Fetal Anatomy  ===========    4-chamber view: normal  Stomach: normal  Kidneys: normal  Bladder: visualized  Gender: female  Wants to know gender: yes    Consultation  ==========    Return visit-patient with HHT/Osler Weber Rendu  Please see prior notes by myself, Dr. Díaz, and Dr. Ivory.  Recommend patient have a consultation with anesthesia provider if possible. Had epidural in prior pregnancy and did well.  Regarding HHT, she has seen the genetic counselor at [facility] (Evelina Rodrigez) and been counseled accordingly regarding the risks of her  disease and the autosomal dominant nature of HHT (7/2016). We discussed that she should have confirmatory testing. She plans to follow-up  with Evelina and have her children tested as well. She denies hemoptysis but reports a  history of epistaxis. To my knowledge, no pulmonary  imaging has been performed. This should be performed following delivery with a CT scan of the chest. Please see previous notes about spinal  imaging. Would recommend mode of delivery per obstetric indications but an assisted second stage with vacuum or forceps is recommended.  Her prior delivery was a prolonged induction (3 days) delivered via vacuum extraction. Would recommend delivery between 39-40 weeks.  Certainly, a more favorable cervix would portend a greater chance of success with vaginal delivery, but given her prior successful vaginal  delivery, induction of labor is reasonable. Would not recommend the pregnancy progress beyond 41 weeks.  Time  I overall spent approximately 15 minutes in face to face time with the patient, greater than 50% of which was in counseling and care  coordination.    Impression  =========    Fetal size is AGA with the EFW at the 25th percentile.  AFV is normal.    Recommendation  ==============    Recommend delivery between 39-40 weeks unless medical indication occurs prior.

## 2018-03-07 ENCOUNTER — ROUTINE PRENATAL (OUTPATIENT)
Dept: OBSTETRICS AND GYNECOLOGY | Facility: CLINIC | Age: 23
End: 2018-03-07
Payer: MEDICAID

## 2018-03-07 VITALS
WEIGHT: 224.88 LBS | BODY MASS INDEX: 39.83 KG/M2 | DIASTOLIC BLOOD PRESSURE: 76 MMHG | SYSTOLIC BLOOD PRESSURE: 122 MMHG

## 2018-03-07 DIAGNOSIS — I78.0 OSLER-WEBER-RENDU DISEASE: ICD-10-CM

## 2018-03-07 DIAGNOSIS — O99.213 OBESITY COMPLICATING PREGNANCY, THIRD TRIMESTER: Primary | ICD-10-CM

## 2018-03-07 PROCEDURE — 99212 OFFICE O/P EST SF 10 MIN: CPT | Mod: TH,S$PBB,, | Performed by: OBSTETRICS & GYNECOLOGY

## 2018-03-07 PROCEDURE — 99212 OFFICE O/P EST SF 10 MIN: CPT | Mod: PBBFAC,TH | Performed by: OBSTETRICS & GYNECOLOGY

## 2018-03-07 PROCEDURE — 99999 PR PBB SHADOW E&M-EST. PATIENT-LVL II: CPT | Mod: PBBFAC,,, | Performed by: OBSTETRICS & GYNECOLOGY

## 2018-03-07 NOTE — PROGRESS NOTES
Pt reports no complaints.  Doing well.  Pt declines exam today.  MFM note was reviewed with pt (see overview in problem list for details as well).  Recommendation to deliver at 39-40 WGA.  Given history of successful vacuum assisted vaginal delivery, OK to proceed with IOL scheduling if favorable cervix at next visit.  Case was discussed with anesthesia (Dr. Norton) - no further imaging necessary prior to delivery; OK for epidural.  Recommend CT chest after delivery to evaluate for lung AVM.  Labor precautions and kick counts.  RTC in 1 week.

## 2018-03-13 ENCOUNTER — ROUTINE PRENATAL (OUTPATIENT)
Dept: OBSTETRICS AND GYNECOLOGY | Facility: CLINIC | Age: 23
End: 2018-03-13
Payer: MEDICAID

## 2018-03-13 VITALS
SYSTOLIC BLOOD PRESSURE: 134 MMHG | DIASTOLIC BLOOD PRESSURE: 86 MMHG | WEIGHT: 222.69 LBS | BODY MASS INDEX: 39.44 KG/M2

## 2018-03-13 DIAGNOSIS — O99.213 OBESITY COMPLICATING PREGNANCY, THIRD TRIMESTER: Primary | ICD-10-CM

## 2018-03-13 PROCEDURE — 99212 OFFICE O/P EST SF 10 MIN: CPT | Mod: PBBFAC,TH | Performed by: ADVANCED PRACTICE MIDWIFE

## 2018-03-13 PROCEDURE — 99999 PR PBB SHADOW E&M-EST. PATIENT-LVL II: CPT | Mod: PBBFAC,,, | Performed by: ADVANCED PRACTICE MIDWIFE

## 2018-03-13 PROCEDURE — 99213 OFFICE O/P EST LOW 20 MIN: CPT | Mod: TH,S$PBB,, | Performed by: ADVANCED PRACTICE MIDWIFE

## 2018-03-13 RX ORDER — ACETAMINOPHEN 500 MG
500 TABLET ORAL EVERY 6 HOURS PRN
Status: ON HOLD | COMMUNITY
End: 2018-03-21 | Stop reason: HOSPADM

## 2018-03-13 RX ORDER — TERBUTALINE SULFATE 1 MG/ML
0.25 INJECTION SUBCUTANEOUS
Status: CANCELLED | OUTPATIENT
Start: 2018-03-13

## 2018-03-13 RX ORDER — SODIUM CHLORIDE 9 MG/ML
INJECTION, SOLUTION INTRAVENOUS
Status: CANCELLED | OUTPATIENT
Start: 2018-03-13

## 2018-03-13 RX ORDER — ONDANSETRON 4 MG/1
8 TABLET, ORALLY DISINTEGRATING ORAL EVERY 8 HOURS PRN
Status: CANCELLED | OUTPATIENT
Start: 2018-03-13

## 2018-03-13 RX ORDER — MISOPROSTOL 100 MCG
25 TABLET ORAL EVERY 4 HOURS
Status: CANCELLED | OUTPATIENT
Start: 2018-03-19 | End: 2018-03-20

## 2018-03-13 RX ORDER — SODIUM CHLORIDE, SODIUM LACTATE, POTASSIUM CHLORIDE, CALCIUM CHLORIDE 600; 310; 30; 20 MG/100ML; MG/100ML; MG/100ML; MG/100ML
INJECTION, SOLUTION INTRAVENOUS CONTINUOUS
Status: CANCELLED | OUTPATIENT
Start: 2018-03-13

## 2018-03-13 RX ORDER — DIPHENHYDRAMINE HCL 25 MG
25 CAPSULE ORAL EVERY 6 HOURS PRN
Status: ON HOLD | COMMUNITY
End: 2018-03-21 | Stop reason: HOSPADM

## 2018-03-13 NOTE — PROGRESS NOTES
Reviewed chart and pt's history. Membranes swept today, pt eager for induction. Discussed with Dr. Valdez induction timing, no openings for Friday 3/16 at 39wks so scheduled Monday 3/19 @ 0001. Discussed cytotec vs cooks catheter. Catheter worked well for pt in the past, will give once dose cytotec at least unless cx has changed by admission. Continue FM counts, s/s of labor reviewed. al

## 2018-03-19 ENCOUNTER — ANESTHESIA (OUTPATIENT)
Dept: OBSTETRICS AND GYNECOLOGY | Facility: HOSPITAL | Age: 23
End: 2018-03-19
Payer: MEDICAID

## 2018-03-19 ENCOUNTER — HOSPITAL ENCOUNTER (INPATIENT)
Facility: HOSPITAL | Age: 23
LOS: 2 days | Discharge: HOME | End: 2018-03-21
Attending: OBSTETRICS & GYNECOLOGY | Admitting: OBSTETRICS & GYNECOLOGY
Payer: MEDICAID

## 2018-03-19 ENCOUNTER — ANESTHESIA EVENT (OUTPATIENT)
Dept: OBSTETRICS AND GYNECOLOGY | Facility: HOSPITAL | Age: 23
End: 2018-03-19
Payer: MEDICAID

## 2018-03-19 DIAGNOSIS — O99.213 OBESITY COMPLICATING PREGNANCY, THIRD TRIMESTER: ICD-10-CM

## 2018-03-19 DIAGNOSIS — I78.0 OSLER-WEBER-RENDU DISEASE: ICD-10-CM

## 2018-03-19 PROBLEM — Z37.9 NORMAL LABOR: Status: RESOLVED | Noted: 2018-03-19 | Resolved: 2018-03-19

## 2018-03-19 PROBLEM — Z37.9 NORMAL LABOR: Status: ACTIVE | Noted: 2018-03-19

## 2018-03-19 LAB
ABO + RH BLD: NORMAL
BASOPHILS # BLD AUTO: 0.01 K/UL
BASOPHILS NFR BLD: 0.1 %
BLD GP AB SCN CELLS X3 SERPL QL: NORMAL
DIFFERENTIAL METHOD: ABNORMAL
EOSINOPHIL # BLD AUTO: 0.1 K/UL
EOSINOPHIL NFR BLD: 0.7 %
ERYTHROCYTE [DISTWIDTH] IN BLOOD BY AUTOMATED COUNT: 12.8 %
HCT VFR BLD AUTO: 36.7 %
HGB BLD-MCNC: 12.7 G/DL
LYMPHOCYTES # BLD AUTO: 2.8 K/UL
LYMPHOCYTES NFR BLD: 32.3 %
MCH RBC QN AUTO: 31.1 PG
MCHC RBC AUTO-ENTMCNC: 34.6 G/DL
MCV RBC AUTO: 90 FL
MONOCYTES # BLD AUTO: 0.6 K/UL
MONOCYTES NFR BLD: 6.9 %
NEUTROPHILS # BLD AUTO: 5.2 K/UL
NEUTROPHILS NFR BLD: 60 %
PLATELET # BLD AUTO: 231 K/UL
PMV BLD AUTO: 10 FL
RBC # BLD AUTO: 4.08 M/UL
WBC # BLD AUTO: 8.68 K/UL

## 2018-03-19 PROCEDURE — 63600175 PHARM REV CODE 636 W HCPCS: Performed by: ADVANCED PRACTICE MIDWIFE

## 2018-03-19 PROCEDURE — 72100002 HC LABOR CARE, 1ST 8 HOURS

## 2018-03-19 PROCEDURE — 63600175 PHARM REV CODE 636 W HCPCS: Performed by: NURSE ANESTHETIST, CERTIFIED REGISTERED

## 2018-03-19 PROCEDURE — 62326 NJX INTERLAMINAR LMBR/SAC: CPT | Performed by: ANESTHESIOLOGY

## 2018-03-19 PROCEDURE — 11000001 HC ACUTE MED/SURG PRIVATE ROOM

## 2018-03-19 PROCEDURE — 3E0D7GC INTRODUCTION OF OTHER THERAPEUTIC SUBSTANCE INTO MOUTH AND PHARYNX, VIA NATURAL OR ARTIFICIAL OPENING: ICD-10-PCS | Performed by: ADVANCED PRACTICE MIDWIFE

## 2018-03-19 PROCEDURE — 25000003 PHARM REV CODE 250: Performed by: ADVANCED PRACTICE MIDWIFE

## 2018-03-19 PROCEDURE — 72100003 HC LABOR CARE, EA. ADDL. 8 HRS

## 2018-03-19 PROCEDURE — 25000003 PHARM REV CODE 250: Performed by: NURSE ANESTHETIST, CERTIFIED REGISTERED

## 2018-03-19 PROCEDURE — 59409 OBSTETRICAL CARE: CPT | Mod: GB,,, | Performed by: ADVANCED PRACTICE MIDWIFE

## 2018-03-19 PROCEDURE — 0HQ9XZZ REPAIR PERINEUM SKIN, EXTERNAL APPROACH: ICD-10-PCS | Performed by: ADVANCED PRACTICE MIDWIFE

## 2018-03-19 PROCEDURE — 51701 INSERT BLADDER CATHETER: CPT

## 2018-03-19 PROCEDURE — 27800517 HC TRAY,EPIDURAL-CONTINUOUS: Performed by: NURSE ANESTHETIST, CERTIFIED REGISTERED

## 2018-03-19 PROCEDURE — 86850 RBC ANTIBODY SCREEN: CPT

## 2018-03-19 PROCEDURE — 72200005 HC VAGINAL DELIVERY LEVEL II

## 2018-03-19 PROCEDURE — 85025 COMPLETE CBC W/AUTO DIFF WBC: CPT

## 2018-03-19 RX ORDER — TERBUTALINE SULFATE 1 MG/ML
0.25 INJECTION SUBCUTANEOUS
Status: DISCONTINUED | OUTPATIENT
Start: 2018-03-19 | End: 2018-03-21 | Stop reason: HOSPADM

## 2018-03-19 RX ORDER — ACETAMINOPHEN 325 MG/1
650 TABLET ORAL EVERY 6 HOURS PRN
Status: DISCONTINUED | OUTPATIENT
Start: 2018-03-19 | End: 2018-03-21 | Stop reason: HOSPADM

## 2018-03-19 RX ORDER — ROPIVACAINE HYDROCHLORIDE 2 MG/ML
INJECTION, SOLUTION EPIDURAL; INFILTRATION; PERINEURAL
Status: DISCONTINUED | OUTPATIENT
Start: 2018-03-19 | End: 2018-03-19

## 2018-03-19 RX ORDER — MISOPROSTOL 100 MCG
25 TABLET ORAL EVERY 4 HOURS PRN
Status: DISCONTINUED | OUTPATIENT
Start: 2018-03-19 | End: 2018-03-19

## 2018-03-19 RX ORDER — OXYTOCIN/RINGER'S LACTATE 20/1000 ML
2 PLASTIC BAG, INJECTION (ML) INTRAVENOUS CONTINUOUS
Status: DISCONTINUED | OUTPATIENT
Start: 2018-03-19 | End: 2018-03-19

## 2018-03-19 RX ORDER — BUTORPHANOL TARTRATE 1 MG/ML
2 INJECTION INTRAMUSCULAR; INTRAVENOUS
Status: DISCONTINUED | OUTPATIENT
Start: 2018-03-19 | End: 2018-03-19

## 2018-03-19 RX ORDER — ONDANSETRON 8 MG/1
8 TABLET, ORALLY DISINTEGRATING ORAL EVERY 8 HOURS PRN
Status: DISCONTINUED | OUTPATIENT
Start: 2018-03-19 | End: 2018-03-21 | Stop reason: HOSPADM

## 2018-03-19 RX ORDER — HYDROCORTISONE 25 MG/G
CREAM TOPICAL 3 TIMES DAILY PRN
Status: DISCONTINUED | OUTPATIENT
Start: 2018-03-19 | End: 2018-03-21 | Stop reason: HOSPADM

## 2018-03-19 RX ORDER — MISOPROSTOL 100 MCG
25 TABLET ORAL ONCE
Status: COMPLETED | OUTPATIENT
Start: 2018-03-19 | End: 2018-03-19

## 2018-03-19 RX ORDER — DIPHENHYDRAMINE HCL 25 MG
25 CAPSULE ORAL EVERY 4 HOURS PRN
Status: DISCONTINUED | OUTPATIENT
Start: 2018-03-19 | End: 2018-03-21 | Stop reason: HOSPADM

## 2018-03-19 RX ORDER — SODIUM CHLORIDE, SODIUM LACTATE, POTASSIUM CHLORIDE, CALCIUM CHLORIDE 600; 310; 30; 20 MG/100ML; MG/100ML; MG/100ML; MG/100ML
INJECTION, SOLUTION INTRAVENOUS CONTINUOUS
Status: DISCONTINUED | OUTPATIENT
Start: 2018-03-19 | End: 2018-03-19

## 2018-03-19 RX ORDER — ROPIVACAINE HYDROCHLORIDE 2 MG/ML
INJECTION, SOLUTION EPIDURAL; INFILTRATION CONTINUOUS
Status: DISCONTINUED | OUTPATIENT
Start: 2018-03-19 | End: 2018-03-21 | Stop reason: HOSPADM

## 2018-03-19 RX ORDER — IBUPROFEN 600 MG/1
600 TABLET ORAL EVERY 6 HOURS PRN
Status: DISCONTINUED | OUTPATIENT
Start: 2018-03-19 | End: 2018-03-20

## 2018-03-19 RX ORDER — MISOPROSTOL 100 MCG
25 TABLET ORAL EVERY 4 HOURS
Status: DISCONTINUED | OUTPATIENT
Start: 2018-03-20 | End: 2018-03-20

## 2018-03-19 RX ORDER — MISOPROSTOL 100 UG/1
100 TABLET ORAL ONCE
Status: DISCONTINUED | OUTPATIENT
Start: 2018-03-19 | End: 2018-03-19

## 2018-03-19 RX ORDER — OXYTOCIN/RINGER'S LACTATE 20/1000 ML
41.65 PLASTIC BAG, INJECTION (ML) INTRAVENOUS CONTINUOUS
Status: DISPENSED | OUTPATIENT
Start: 2018-03-19 | End: 2018-03-20

## 2018-03-19 RX ORDER — ROPIVACAINE HYDROCHLORIDE 2 MG/ML
INJECTION, SOLUTION EPIDURAL; INFILTRATION; PERINEURAL CONTINUOUS PRN
Status: DISCONTINUED | OUTPATIENT
Start: 2018-03-19 | End: 2018-03-19

## 2018-03-19 RX ORDER — ONDANSETRON 8 MG/1
8 TABLET, ORALLY DISINTEGRATING ORAL ONCE
Status: COMPLETED | OUTPATIENT
Start: 2018-03-19 | End: 2018-03-19

## 2018-03-19 RX ORDER — CEFAZOLIN SODIUM 1 G/3ML
2 INJECTION, POWDER, FOR SOLUTION INTRAMUSCULAR; INTRAVENOUS
Status: DISCONTINUED | OUTPATIENT
Start: 2018-03-19 | End: 2018-03-19

## 2018-03-19 RX ORDER — OXYTOCIN/RINGER'S LACTATE 20/1000 ML
333 PLASTIC BAG, INJECTION (ML) INTRAVENOUS CONTINUOUS
Status: DISPENSED | OUTPATIENT
Start: 2018-03-19 | End: 2018-03-20

## 2018-03-19 RX ORDER — LIDOCAINE HYDROCHLORIDE AND EPINEPHRINE 15; 5 MG/ML; UG/ML
INJECTION, SOLUTION EPIDURAL
Status: DISCONTINUED | OUTPATIENT
Start: 2018-03-19 | End: 2018-03-19

## 2018-03-19 RX ORDER — SODIUM CHLORIDE 9 MG/ML
INJECTION, SOLUTION INTRAVENOUS
Status: DISCONTINUED | OUTPATIENT
Start: 2018-03-19 | End: 2018-03-21 | Stop reason: HOSPADM

## 2018-03-19 RX ORDER — HYDROCODONE BITARTRATE AND ACETAMINOPHEN 5; 325 MG/1; MG/1
1 TABLET ORAL EVERY 4 HOURS PRN
Status: DISCONTINUED | OUTPATIENT
Start: 2018-03-19 | End: 2018-03-20

## 2018-03-19 RX ORDER — DOCUSATE SODIUM 100 MG/1
200 CAPSULE, LIQUID FILLED ORAL 2 TIMES DAILY PRN
Status: DISCONTINUED | OUTPATIENT
Start: 2018-03-19 | End: 2018-03-21 | Stop reason: HOSPADM

## 2018-03-19 RX ORDER — DIPHENHYDRAMINE HYDROCHLORIDE 50 MG/ML
25 INJECTION INTRAMUSCULAR; INTRAVENOUS EVERY 4 HOURS PRN
Status: DISCONTINUED | OUTPATIENT
Start: 2018-03-19 | End: 2018-03-21 | Stop reason: HOSPADM

## 2018-03-19 RX ADMIN — Medication 25 MCG: at 06:03

## 2018-03-19 RX ADMIN — BUTORPHANOL TARTRATE 2 MG: 1 INJECTION, SOLUTION INTRAMUSCULAR; INTRAVENOUS at 07:03

## 2018-03-19 RX ADMIN — SODIUM CHLORIDE, POTASSIUM CHLORIDE, SODIUM LACTATE AND CALCIUM CHLORIDE: 600; 310; 30; 20 INJECTION, SOLUTION INTRAVENOUS at 11:03

## 2018-03-19 RX ADMIN — ONDANSETRON 8 MG: 8 TABLET, ORALLY DISINTEGRATING ORAL at 12:03

## 2018-03-19 RX ADMIN — LIDOCAINE HYDROCHLORIDE,EPINEPHRINE BITARTRATE 2.5 ML: 15; .005 INJECTION, SOLUTION EPIDURAL; INFILTRATION; INTRACAUDAL; PERINEURAL at 04:03

## 2018-03-19 RX ADMIN — Medication 25 MCG: at 01:03

## 2018-03-19 RX ADMIN — SODIUM CHLORIDE, POTASSIUM CHLORIDE, SODIUM LACTATE AND CALCIUM CHLORIDE 1000 ML: 600; 310; 30; 20 INJECTION, SOLUTION INTRAVENOUS at 04:03

## 2018-03-19 RX ADMIN — Medication 25 MCG: at 11:03

## 2018-03-19 RX ADMIN — BUTORPHANOL TARTRATE 2 MG: 1 INJECTION, SOLUTION INTRAMUSCULAR; INTRAVENOUS at 03:03

## 2018-03-19 RX ADMIN — BUTORPHANOL TARTRATE 2 MG: 1 INJECTION, SOLUTION INTRAMUSCULAR; INTRAVENOUS at 02:03

## 2018-03-19 RX ADMIN — ROPIVACAINE HYDROCHLORIDE 5 ML: 2 INJECTION, SOLUTION EPIDURAL; INFILTRATION at 09:03

## 2018-03-19 RX ADMIN — SODIUM CHLORIDE, POTASSIUM CHLORIDE, SODIUM LACTATE AND CALCIUM CHLORIDE: 600; 310; 30; 20 INJECTION, SOLUTION INTRAVENOUS at 05:03

## 2018-03-19 RX ADMIN — Medication 2 MILLI-UNITS/MIN: at 06:03

## 2018-03-19 RX ADMIN — ROPIVACAINE HYDROCHLORIDE 12 ML/HR: 2 INJECTION, SOLUTION EPIDURAL; INFILTRATION at 05:03

## 2018-03-19 RX ADMIN — BUTORPHANOL TARTRATE 2 MG: 1 INJECTION, SOLUTION INTRAMUSCULAR; INTRAVENOUS at 11:03

## 2018-03-19 RX ADMIN — LIDOCAINE HYDROCHLORIDE,EPINEPHRINE BITARTRATE 5 ML: 15; .005 INJECTION, SOLUTION EPIDURAL; INFILTRATION; INTRACAUDAL; PERINEURAL at 06:03

## 2018-03-19 RX ADMIN — ROPIVACAINE HYDROCHLORIDE 12 ML: 2 INJECTION, SOLUTION EPIDURAL; INFILTRATION at 04:03

## 2018-03-19 NOTE — PROGRESS NOTES
[facility]  Obstetrics  Labor Progress Note    Patient Name: Angy Bay  MRN: [id number]  Admission Date: 3/19/2018  Hospital Length of Stay: 0 days  Attending Physician: BERNY Mclean MD  Primary Care Provider: Primary Doctor No    Subjective:     Principal Problem:Osler-Weber-Rendu disease    Hospital Course:  Admit to LD  Cytotec IOL   3/19/18 \1100hrs- Cat 1 strip with ctx's q2, uncomfortable- Iv hydration abd Stadol2 mg given  1148hrs- 3rd Cytotec placed  1545hrs- Cat 1  Strip with ctx's q2 - Options discussed  1630hrs Cat 1 strip with ctx's q2, states will have Cox but needs epidural first OK    Interval History:  Angy is a 22 y.o.  at 39w3d. She is doing well. Distressed with contractions and desires epidural for Cox placement     Objective:     Vital Signs (Most Recent):  Temp: 97.6 °F (36.4 °C) (18 1501)  Pulse: 68 (18 1601)  Resp: 18 (18 1501)  BP: 136/81 (18 1601) Vital Signs (24h Range):  Temp:  [97.3 °F (36.3 °C)-98.2 °F (36.8 °C)] 97.6 °F (36.4 °C)  Pulse:  [53-81] 68  Resp:  [17-20] 18  BP: (109-149)/(63-94) 136/81     Weight: 102.5 kg (226 lb)  Body mass index is 40.03 kg/m².    FHT: 115bpm Cat 1 (reassuring)  TOCO:  Q 2 minutes    Cervical Exam: Deferred /-3 at 1545hrs- No change Per RN       Significant Labs:  Lab Results   Component Value Date    GROUPTRH B POS 2018    HEPBSAG Negative 2017    STREPBCULT No Group B Streptococcus isolated 2018       I have personallly reviewed all pertinent lab results from the last 24 hours.    Physical Exam    Assessment/Plan:     22 y.o. female  at 39w3d for:    * Osler-Weber-Rendu disease    Per [facility] recommendations, del @ 39 weeks gestation  MRI negative for AVMs, recommend CT of lungs at postpartum  MFM cleared for vaginal delivery, consider vacuum extraction if prolonged second stage of labor  Anesthesia cleared pt for epidural during pregnancy  Cytotec IOL  R/B  discussed with pt. And family, pt. Agrees to continue with POC          Normal labor    Cytotec IOL per protocol   1640hrs- Cat 1 strip with ctx's q2 desiring epidural for Cox bulb placement following 3x Cytotec- Approved  GBS-NEg              Ayana Shaffer CNM  Obstetrics  [facility]

## 2018-03-19 NOTE — SUBJECTIVE & OBJECTIVE
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    Obstetric History       T1      L1     SAB0   TAB0   Ectopic0   Multiple0   Live Births1       # Outcome Date GA Lbr Sherif/2nd Weight Sex Delivery Anes PTL Lv   2 Current            1 Term 16 41w0d  3.035 kg (6 lb 11.1 oz) F Vag-Vacuum EPI N JUAN CARLOS      Name: BALTA,BABY GIRL       Complications: Oligohydramnios      Apgar1:  4                Apgar5: 8        Past Medical History:   Diagnosis Date    Osler-Weber-Rendu disease      Past Surgical History:   Procedure Laterality Date    TONSILLECTOMY, ADENOIDECTOMY         PTA Medications   Medication Sig    acetaminophen (TYLENOL) 500 MG tablet Take 500 mg by mouth every 6 (six) hours as needed for Pain.    diphenhydrAMINE (BENADRYL) 25 mg capsule Take 25 mg by mouth every 6 (six) hours as needed for Itching.       Review of patient's allergies indicates:  No Known Allergies     Family History     None        Social History Main Topics    Smoking status: Former Smoker    Smokeless tobacco: Never Used    Alcohol use No    Drug use: Yes      Comment: marijuana,past     Sexual activity: Not on file     Review of Systems   Constitutional: Negative.    HENT: Negative.    Eyes: Negative.    Respiratory: Negative.    Cardiovascular: Negative.    Gastrointestinal: Negative.    Endocrine: Negative.    Genitourinary: Negative.    Musculoskeletal: Negative.    Skin:  Negative.   Neurological: Negative.    Hematological: Negative.    Psychiatric/Behavioral: Negative.    Breast: negative.    All other systems reviewed and are negative.     Objective:     Vital Signs (Most Recent):    Vital Signs (24h Range):           There is no height or weight on file to calculate BMI.    FHT: 130 Cat 1 (reassuring)  TOCO:  none    Physical Exam:   Constitutional: She is oriented to person, place, and time. Vital signs are normal. She appears well-developed and well-nourished. She is cooperative.    HENT:   Head: Normocephalic and atraumatic.     Neck: Normal range of motion. Neck supple.    Cardiovascular: Normal rate, regular rhythm and normal heart sounds.     Pulmonary/Chest: Effort normal.        Abdominal: Soft.   Gravid, non-tender     Genitourinary: Vagina normal and uterus normal. Pelvic exam was performed with patient supine. Cervix is normal. Labial bartholins normal.          Musculoskeletal: Normal range of motion and moves all extremeties.       Neurological: She is alert and oriented to person, place, and time. She has normal strength and normal reflexes.    Skin: Skin is warm, dry and intact. Capillary refill takes less than 2 seconds.    Psychiatric: She has a normal mood and affect. Her speech is normal and behavior is normal. Judgment and thought content normal. Cognition and memory are normal.       Cervix:  Dilation:  1  Effacement:  50%  Station: -2  Presentation: Vertex     Significant Labs:  Lab Results   Component Value Date    GROUPTRH B POS 08/09/2017    HEPBSAG Negative 08/09/2017    STREPBCULT No Group B Streptococcus isolated 02/27/2018       I have personallly reviewed all pertinent lab results from the last 24 hours.

## 2018-03-19 NOTE — PROGRESS NOTES
"Discussed feeding choice with mother.  Reviewed benefits of breastfeeding.  Patient given "What to Expect in the First 48 Hours" handout. Mother states her intention is to exclusively bottle feed with formula. Pt denies desire to breastfeed.   "

## 2018-03-19 NOTE — NURSING
Spoke on phone with AYO Mcdowell MD. MD reinforced no NSAIDs postpartum. Give IV antibiotics as ordered 30-60 minutes before delivery. Lung CT postpartum.

## 2018-03-19 NOTE — PROGRESS NOTES
Pt spoke with WILMAR Norton CNM and verbalized understanding of possible CT scan after delivery to check maternal health due to Osler Hammond Rendu Disease.

## 2018-03-19 NOTE — HOSPITAL COURSE
Admit to LD  Cytotec IOL   3/19/18 \1100hrs- Cat 1 strip with ctx's q2, uncomfortable- Iv hydration abd Stadol2 mg given  1148hrs- 3rd Cytotec placed  1545hrs- Cat 1  Strip with ctx's q2 - Options discussed  1630hrs Cat 1 strip with ctx's q2, states will have Cox but needs epidural first OK  1735hrs Cat 1 strip with ctx's q2-3 3/70/-3 Comfortable S/P epidural Start pitocin per protocol  1930hrs Cat 1 strip with ctx's q2-3 , pitocin at 6mu. Complete and +1 per RN. Sat up and allowed to labor down  : , routine pp care, routine perineal care  3/20/18 ppd1, routine care   3/21/18 ppd2, routine care. D/c home. Consulted Dr Redmond regarding M recommendation for chest xray. Needs at 6 weeks pp

## 2018-03-19 NOTE — ANESTHESIA PREPROCEDURE EVALUATION
03/19/2018  Angy Bay is a 22 y.o., female.    Anesthesia Evaluation    I have reviewed the Patient Summary Reports.     I have reviewed the Medications.     Review of Systems  Anesthesia Hx:  No problems with previous Anesthesia  History of prior surgery of interest to airway management or planning: Previous anesthesia: General, Epidural Denies Family Hx of Anesthesia complications.   Denies Personal Hx of Anesthesia complications.   Social:  Former Smoker    Hematology/Oncology:  Hematology Normal   Oncology Normal     EENT/Dental:EENT/Dental Normal   Cardiovascular:  Cardiovascular Normal     Pulmonary:  Pulmonary Normal    Renal/:  Renal/ Normal     Hepatic/GI:  Hepatic/GI Normal    Musculoskeletal:  Musculoskeletal Normal    Neurological:   Mri negative for AVM   Endocrine:  Endocrine Normal    Dermatological:  Skin Normal    Psych:  Psychiatric Normal           Physical Exam  General:  Obesity    Airway/Jaw/Neck:  Airway Findings: Mouth Opening: Normal Tongue: Normal  General Airway Assessment: Adult  Mallampati: III  Improves to III, II with phonation.  TM Distance: Normal, at least 6 cm  Jaw/Neck Findings:  Neck ROM: Normal ROM  Neck Findings:  Girth Increased      Dental:  Dental Findings: upper partial dentures        Mental Status:  Mental Status Findings:  Cooperative, Alert and Oriented  crying       Anesthesia Plan  Type of Anesthesia, risks & benefits discussed:  Anesthesia Type:  epidural  Patient's Preference:   Intra-op Monitoring Plan:   Intra-op Monitoring Plan Comments:   Post Op Pain Control Plan:   Post Op Pain Control Plan Comments:   Induction:    Beta Blocker:  Patient is not currently on a Beta-Blocker (No further documentation required).       Informed Consent: Patient understands risks and agrees with Anesthesia plan.  Questions answered. Anesthesia consent signed  with patient.  ASA Score: 2     Day of Surgery Review of History & Physical: I have interviewed and examined the patient. I have reviewed the patient's H&P dated: 3/19/18. There are no significant changes.  H&P update referred to the provider.         Ready For Surgery From Anesthesia Perspective.

## 2018-03-19 NOTE — ASSESSMENT & PLAN NOTE
Per MFM recommendations, del @ 39 weeks gestation  MRI negative for AVMs, recommend CT of lungs at postpartum  MFM cleared for vaginal delivery, consider vacuum extraction if prolonged second stage of labor  Anesthesia cleared pt for epidural during pregnancy  Cytotec IOL  R/B discussed with pt. And family, pt. Agrees to continue with POC

## 2018-03-19 NOTE — PROGRESS NOTES
Angy Bay 22 y.o.  39w3d admitted for induction on recommendation from M.   Pt has a history significant for Hereditary hemorrhagic telangiectasia (HHT; also called Osler-Weber-Rendu syndrome). She is closely followed by [facility].   She has a history of an uncomplicated VAVD of her first daughter weighing 6'11 after a 2day induction for postdates at 41wga. Pt feels this baby (Scarlet)  weighs approximately the same as her first at birth. Father of the baby is the same.   Pt has not had any clinical manifestation of her HHT condition.   She currently has no complaints. She is comfortable on her IV pain medication of stadol, and s/p 2nd cytotec dosing at 6am.     Reviewed the pt's chart and [facility] recommendations.   Reviewed with Edmund, the CNM on call today, and pt's nurse and charge nurse, the pt's history, and plan.   Reviewed need for vigilance in general, but especially for any pulmonary complaints (SOB, hemoptysis).   Also, reviewed plan to have infective endocarditis antibiotic prophylaxis about 30-60min prior to deliver according to AHA recs (2g ancef IV).  Anesthesia/epidural clearance was reviewed with anesthesia team - aware of HHT AVM risks in spinal cord.   Plan for laboring down once complete. Vacuum assistance with delivery.  Postpartum meds: avoidance of NSAIDs.   Plan was reviewed with pt for setting up postpartum pulmonary imaging.     Pt vitals, NST, and exam repeated and agree with CNM.   Continue current management with  Induction.

## 2018-03-19 NOTE — ANESTHESIA PROCEDURE NOTES
Epidural    Patient location during procedure: OB   Reason for block: primary anesthetic   Diagnosis: iup   Start time: 3/19/2018 4:55 PM  Timeout: 3/19/2018 4:54 PM  End time: 3/19/2018 5:00 PM  Surgery related to: labor  Staffing  Anesthesiologist: MAHESH JONES  Resident/CRNA: SHORTY BOOKER  Performed: resident/CRNA   Preanesthetic Checklist  Completed: patient identified, surgical consent, pre-op evaluation, timeout performed, IV checked, risks and benefits discussed, monitors and equipment checked, anesthesia consent given, hand hygiene performed and patient being monitored  Preparation  Patient position: sitting  Prep: Betadine  Patient monitoring: Pulse Ox and Blood Pressure  Epidural  Skin Anesthetic: lidocaine 1%  Skin Wheal: 3 mL  Administration type: continuous  Approach: midline  Interspace: L3-4  Injection technique: CARI air  Needle and Epidural Catheter  Needle type: Tuohy   Needle gauge: 18  Needle length: 3.5 inches  Needle insertion depth: 7 cm  Catheter type: multi-orifice  Catheter size: 20 G  Catheter at skin depth: 12 cm  Test dose: 5 mL of lidocaine 1.5% with Epi 1-to-200,000  Additional Documentation: incremental injection, negative aspiration for heme and CSF, no signs/symptoms of IV or SA injection, no paresthesia on injection, no significant pain on injection and no significant complaints from patient  Needle localization: anatomical landmarks  Medications:  Bolus administered: 12 mL of 0.2% ropivacaine  Time between aspirations: 0.3 minutes  Assessment  Upper dermatomal levels - Left: T10  Right: T10   Dermatomal levels determined by alcohol wipe  Ease of block: easy  Patient's tolerance of the procedure: comfortable throughout block and no complaints  Post dural Puncture Headache?: No

## 2018-03-19 NOTE — SUBJECTIVE & OBJECTIVE
Interval History:  Angy is a 22 y.o.  at 39w3d. She is doing well. Distressed with contractions and desires epidural for Cox placement     Objective:     Vital Signs (Most Recent):  Temp: 97.6 °F (36.4 °C) (18 1501)  Pulse: 68 (18 1601)  Resp: 18 (18 1501)  BP: 136/81 (18 1601) Vital Signs (24h Range):  Temp:  [97.3 °F (36.3 °C)-98.2 °F (36.8 °C)] 97.6 °F (36.4 °C)  Pulse:  [53-81] 68  Resp:  [17-20] 18  BP: (109-149)/(63-94) 136/81     Weight: 102.5 kg (226 lb)  Body mass index is 40.03 kg/m².    FHT: 115bpm Cat 1 (reassuring)  TOCO:  Q 2 minutes    Cervical Exam: Deferred /3 at 1545hrs- No change Per RN       Significant Labs:  Lab Results   Component Value Date    GROUPTRH B POS 2018    HEPBSAG Negative 2017    STREPBCULT No Group B Streptococcus isolated 2018       I have personallly reviewed all pertinent lab results from the last 24 hours.    Physical Exam

## 2018-03-19 NOTE — PROGRESS NOTES
S: Doing well, s/p pain medication    O: VSS/AF   Cat 1 reassuring  [place] q 1.5-3 minutes  Ve: 1-2/50/-2, cytotec placed    A: Cytotec IOL    P: Continue cytotec IOL   Continue to monitor maternal and fetal status  Anticipate progression of labor and NVD    JOANIE Waller

## 2018-03-19 NOTE — ASSESSMENT & PLAN NOTE
Cytotec IOL per protocol   1640hrs- Cat 1 strip with ctx's q2 desiring epidural for Cox bulb placement following 3x Cytotec- Approved  GBS-NEg

## 2018-03-19 NOTE — H&P
[facility] -   Obstetrics  History & Physical    Patient Name: Angy Bay  MRN: [id number]  Admission Date: 3/19/2018  Primary Care Provider: Primary Doctor No    Subjective:     Principal Problem:Osler-Weber-Rendu disease    History of Present Illness:  3/19/18 @ 0001 IOL for Osler Hammond Rendu disease per MFM     Obstetric HPI:  Patient reports None contractions, active fetal movement, No vaginal bleeding , No loss of fluid     This pregnancy has been complicated by   Obesity  Osler Hammond Rendu disease, [facility] recommendation to del at 39-40 weeks    Obstetric History       T1      L1     SAB0   TAB0   Ectopic0   Multiple0   Live Births1       # Outcome Date GA Lbr Sherif/2nd Weight Sex Delivery Anes PTL Lv   2 Current            1 Term 16 41w0d  3.035 kg (6 lb 11.1 oz) F Vag-Vacuum EPI N JUAN CARLOS      Name: BALTA,BABY GIRL       Complications: Oligohydramnios      Apgar1:  4                Apgar5: 8        Past Medical History:   Diagnosis Date    Osler-Weber-Rendu disease      Past Surgical History:   Procedure Laterality Date    TONSILLECTOMY, ADENOIDECTOMY         PTA Medications   Medication Sig    acetaminophen (TYLENOL) 500 MG tablet Take 500 mg by mouth every 6 (six) hours as needed for Pain.    diphenhydrAMINE (BENADRYL) 25 mg capsule Take 25 mg by mouth every 6 (six) hours as needed for Itching.       Review of patient's allergies indicates:  No Known Allergies     Family History     None        Social History Main Topics    Smoking status: Former Smoker    Smokeless tobacco: Never Used    Alcohol use No    Drug use: Yes      Comment: marijuana,past     Sexual activity: Not on file     Review of Systems   Constitutional: Negative.    HENT: Negative.    Eyes: Negative.    Respiratory: Negative.    Cardiovascular: Negative.    Gastrointestinal: Negative.    Endocrine: Negative.    Genitourinary: Negative.    Musculoskeletal: Negative.    Skin:  Negative.    Neurological: Negative.    Hematological: Negative.    Psychiatric/Behavioral: Negative.    Breast: negative.    All other systems reviewed and are negative.     Objective:     Vital Signs (Most Recent):    Vital Signs (24h Range):           There is no height or weight on file to calculate BMI.    FHT: 130 Cat 1 (reassuring)  TOCO: none    Physical Exam:   Constitutional: She is oriented to person, place, and time. Vital signs are normal. She appears well-developed and well-nourished. She is cooperative.    HENT:   Head: Normocephalic and atraumatic.     Neck: Normal range of motion. Neck supple.    Cardiovascular: Normal rate, regular rhythm and normal heart sounds.     Pulmonary/Chest: Effort normal.        Abdominal: Soft.   Gravid, non-tender     Genitourinary: Vagina normal and uterus normal. Pelvic exam was performed with patient supine. Cervix is normal. Labial bartholins normal.          Musculoskeletal: Normal range of motion and moves all extremeties.       Neurological: She is alert and oriented to person, place, and time. She has normal strength and normal reflexes.    Skin: Skin is warm, dry and intact. Capillary refill takes less than 2 seconds.    Psychiatric: She has a normal mood and affect. Her speech is normal and behavior is normal. Judgment and thought content normal. Cognition and memory are normal.       Cervix:  Dilation:  1  Effacement:  50%  Station: -2  Presentation: Vertex     Significant Labs:  Lab Results   Component Value Date    GROUPTRH B POS 2017    HEPBSAG Negative 2017    STREPBCULT No Group B Streptococcus isolated 2018       I have personallly reviewed all pertinent lab results from the last 24 hours.    Assessment/Plan:     22 y.o. female  at 39w3d for:    * Osler-Weber-Rendu disease    Per [facility] recommendations, del @ 39 weeks gestation  MRI negative for AVMs, recommend CT of lungs at postpartum  [facility] cleared for vaginal delivery, consider vacuum  extraction if prolonged second stage of labor  Anesthesia cleared pt for epidural during pregnancy  Cytotec IOL  R/B discussed with pt. And family, pt. Agrees to continue with POC              Devon Norton CNM  Obstetrics  [facility] - [facility] Das

## 2018-03-19 NOTE — HPI
3/19/18 @ 0001 IOL for Osler Hammond Rendu disease per [facility]   1630- Distressed and desires epidural

## 2018-03-20 ENCOUNTER — TELEPHONE (OUTPATIENT)
Dept: OBSTETRICS AND GYNECOLOGY | Facility: CLINIC | Age: 23
End: 2018-03-20

## 2018-03-20 LAB
BASOPHILS # BLD AUTO: 0 K/UL
BASOPHILS NFR BLD: 0 %
DIFFERENTIAL METHOD: ABNORMAL
EOSINOPHIL # BLD AUTO: 0.2 K/UL
EOSINOPHIL NFR BLD: 1.4 %
ERYTHROCYTE [DISTWIDTH] IN BLOOD BY AUTOMATED COUNT: 13 %
HCT VFR BLD AUTO: 36.7 %
HGB BLD-MCNC: 12.5 G/DL
LYMPHOCYTES # BLD AUTO: 3 K/UL
LYMPHOCYTES NFR BLD: 28.5 %
MCH RBC QN AUTO: 31.1 PG
MCHC RBC AUTO-ENTMCNC: 34.1 G/DL
MCV RBC AUTO: 91 FL
MONOCYTES # BLD AUTO: 0.7 K/UL
MONOCYTES NFR BLD: 6.1 %
NEUTROPHILS # BLD AUTO: 6.8 K/UL
NEUTROPHILS NFR BLD: 64 %
PLATELET # BLD AUTO: 197 K/UL
PMV BLD AUTO: 10.1 FL
RBC # BLD AUTO: 4.02 M/UL
WBC # BLD AUTO: 10.65 K/UL

## 2018-03-20 PROCEDURE — 25000003 PHARM REV CODE 250: Performed by: PHYSICIAN ASSISTANT

## 2018-03-20 PROCEDURE — 99231 SBSQ HOSP IP/OBS SF/LOW 25: CPT | Mod: ,,, | Performed by: ADVANCED PRACTICE MIDWIFE

## 2018-03-20 PROCEDURE — 25000003 PHARM REV CODE 250: Performed by: ADVANCED PRACTICE MIDWIFE

## 2018-03-20 PROCEDURE — 36415 COLL VENOUS BLD VENIPUNCTURE: CPT

## 2018-03-20 PROCEDURE — 11000001 HC ACUTE MED/SURG PRIVATE ROOM

## 2018-03-20 PROCEDURE — 85025 COMPLETE CBC W/AUTO DIFF WBC: CPT

## 2018-03-20 RX ORDER — OXYCODONE AND ACETAMINOPHEN 5; 325 MG/1; MG/1
1 TABLET ORAL EVERY 4 HOURS PRN
Status: DISCONTINUED | OUTPATIENT
Start: 2018-03-20 | End: 2018-03-21 | Stop reason: HOSPADM

## 2018-03-20 RX ADMIN — HYDROCODONE BITARTRATE AND ACETAMINOPHEN 1 TABLET: 5; 325 TABLET ORAL at 02:03

## 2018-03-20 RX ADMIN — OXYCODONE HYDROCHLORIDE AND ACETAMINOPHEN 1 TABLET: 5; 325 TABLET ORAL at 09:03

## 2018-03-20 RX ADMIN — OXYCODONE HYDROCHLORIDE AND ACETAMINOPHEN 1 TABLET: 5; 325 TABLET ORAL at 05:03

## 2018-03-20 RX ADMIN — HYDROCODONE BITARTRATE AND ACETAMINOPHEN 1 TABLET: 5; 325 TABLET ORAL at 08:03

## 2018-03-20 RX ADMIN — HYDROCODONE BITARTRATE AND ACETAMINOPHEN 1 TABLET: 5; 325 TABLET ORAL at 12:03

## 2018-03-20 NOTE — PROGRESS NOTES
[facility] -   Obstetrics  Postpartum Progress Note    Patient Name: Angy Bay  MRN: [id number]  Admission Date: 3/19/2018  Hospital Length of Stay: 1 days  Attending Physician: BERNY Mclean MD  Primary Care Provider: Primary Doctor No    Subjective:     Principal Problem: (normal spontaneous vaginal delivery)    Hospital course: Admit to LD  Cytotec IOL   3/19/18 \1100hrs- Cat 1 strip with ctx's q2, uncomfortable- Iv hydration abd Stadol2 mg given  1148hrs- 3rd Cytotec placed  1545hrs- Cat 1  Strip with ctx's q2 - Options discussed  1630hrs Cat 1 strip with ctx's q2, states will have Cox but needs epidural first OK  1735hrs Cat 1 strip with ctx's q2-3 3/70/-3 Comfortable S/P epidural Start pitocin per protocol  1930hrs Cat 1 strip with ctx's q2-3 , pitocin at 6mu. Complete and +1 per RN. Sat up and allowed to labor down  7: , routine pp care, routine perineal care  3/20/18 ppd1, routine care       She is doing well this morning. She is tolerating a regular diet without nausea or vomiting. She is voiding spontaneously. She is ambulating. She has passed flatus, and has not a BM. Vaginal bleeding is mild. She denies fever or chills. Abdominal pain is mild and controlled with oral medications. She is not breastfeeding..    Objective:     Vital Signs (Most Recent):  Temp: 98.1 °F (36.7 °C) (18 0806)  Pulse: (!) 58 (18 0806)  Resp: 18 (18 0806)  BP: (!) 140/94 (18 0806)  SpO2: 100 % (18 2336) Vital Signs (24h Range):  Temp:  [97.7 °F (36.5 °C)-98.3 °F (36.8 °C)] 98.1 °F (36.7 °C)  Pulse:  [] 58  Resp:  [18-20] 18  SpO2:  [97 %-100 %] 100 %  BP: (118-156)/() 140/94     Weight: 102.5 kg (226 lb)  Body mass index is 40.03 kg/m².      Intake/Output Summary (Last 24 hours) at 18 1515  Last data filed at 18 0100   Gross per 24 hour   Intake                0 ml   Output              650 ml   Net             -650 ml       Significant  Labs:  Lab Results   Component Value Date    GROUPTRH B POS 2018    HEPBSAG Negative 2017    STREPBCULT No Group B Streptococcus isolated 2018       Recent Labs  Lab 18  0738   HGB 12.5   HCT 36.7*       I have personallly reviewed all pertinent lab results from the last 24 hours.    Physical Exam:   Constitutional: She is oriented to person, place, and time. She appears well-developed and well-nourished.    HENT:   Head: Normocephalic.    Eyes: Pupils are equal, round, and reactive to light.    Neck: Normal range of motion.     Pulmonary/Chest: Effort normal and breath sounds normal.        Abdominal: Soft.             Musculoskeletal: Normal range of motion.       Neurological: She is alert and oriented to person, place, and time.    Skin: Skin is warm and dry.    Psychiatric: She has a normal mood and affect. Her behavior is normal. Judgment and thought content normal.       Assessment/Plan:     22 y.o. female  for:    *  (normal spontaneous vaginal delivery)    Routine pp care        Perineal laceration    Routine perineal care        Osler-Weber-Rendu disease    Per [facility] recommendations, del @ 39 weeks gestation  MRI negative for AVMs, recommend CT of lungs at postpartum  [facility] cleared for vaginal delivery, consider vacuum extraction if prolonged second stage of labor  Anesthesia cleared pt for epidural during pregnancy  Cytotec IOL  R/B discussed with pt. And family, pt. Agrees to continue with POC              Disposition: As patient meets milestones, will plan to discharge 3/21/18.    Kenisha Jimenez CNM  Obstetrics  [facility] [place]

## 2018-03-20 NOTE — NURSING
Assisted pt up to bathroom. Pt was able to stand with assistance. When moving pt moved leg, pt fell and hit right knee. Assisted pt back to bed. Pt reported no pain. Notified provider of fall. Provided pt with bed pan to void.

## 2018-03-20 NOTE — SUBJECTIVE & OBJECTIVE
Hospital course: Admit to LD  Cytotec IOL   3/19/18 \1100hrs- Cat 1 strip with ctx's q2, uncomfortable- Iv hydration abd Stadol2 mg given  1148hrs- 3rd Cytotec placed  1545hrs- Cat 1  Strip with ctx's q2 - Options discussed  1630hrs Cat 1 strip with ctx's q2, states will have Cox but needs epidural first OK  1735hrs Cat 1 strip with ctx's q2-3 3/70/-3 Comfortable S/P epidural Start pitocin per protocol  1930hrs Cat 1 strip with ctx's q2-3 , pitocin at 6mu. Complete and +1 per RN. Sat up and allowed to labor down  : , routine pp care, routine perineal care  3/20/18 ppd1, routine care       She is doing well this morning. She is tolerating a regular diet without nausea or vomiting. She is voiding spontaneously. She is ambulating. She has passed flatus, and has not a BM. Vaginal bleeding is mild. She denies fever or chills. Abdominal pain is mild and controlled with oral medications. She is not breastfeeding..    Objective:     Vital Signs (Most Recent):  Temp: 98.1 °F (36.7 °C) (18 0806)  Pulse: (!) 58 (18 0806)  Resp: 18 (18 0806)  BP: (!) 140/94 (18 0806)  SpO2: 100 % (18 2336) Vital Signs (24h Range):  Temp:  [97.7 °F (36.5 °C)-98.3 °F (36.8 °C)] 98.1 °F (36.7 °C)  Pulse:  [] 58  Resp:  [18-20] 18  SpO2:  [97 %-100 %] 100 %  BP: (118-156)/() 140/94     Weight: 102.5 kg (226 lb)  Body mass index is 40.03 kg/m².      Intake/Output Summary (Last 24 hours) at 18 1515  Last data filed at 18 0100   Gross per 24 hour   Intake                0 ml   Output              650 ml   Net             -650 ml       Significant Labs:  Lab Results   Component Value Date    GROUPTRH B POS 2018    HEPBSAG Negative 2017    STREPBCULT No Group B Streptococcus isolated 2018       Recent Labs  Lab 18  0738   HGB 12.5   HCT 36.7*       I have personallly reviewed all pertinent lab results from the last 24 hours.    Physical Exam:   Constitutional: She is  oriented to person, place, and time. She appears well-developed and well-nourished.    HENT:   Head: Normocephalic.    Eyes: Pupils are equal, round, and reactive to light.    Neck: Normal range of motion.     Pulmonary/Chest: Effort normal and breath sounds normal.        Abdominal: Soft.             Musculoskeletal: Normal range of motion.       Neurological: She is alert and oriented to person, place, and time.    Skin: Skin is warm and dry.    Psychiatric: She has a normal mood and affect. Her behavior is normal. Judgment and thought content normal.

## 2018-03-20 NOTE — ANESTHESIA RELEASE NOTE
"Anesthesia Release from PACU Note    Patient: Angy Bay    epidural    Anesthesia type: epidural    Post pain: Adequate analgesia    Post assessment: no apparent anesthetic complications    Last Vitals:   Visit Vitals  /72   Pulse 86   Temp 36.5 °C (97.7 °F) (Axillary)   Resp 18   Ht 5' 3" (1.6 m)   Wt 102.5 kg (226 lb)   LMP 06/16/2017   SpO2 100%   Breastfeeding? No   BMI 40.03 kg/m²       Post vital signs: stable    Level of consciousness: awake, alert  and oriented    Nausea/Vomiting: no nausea/no vomiting    Complications: none    Airway Patency: patent    Respiratory: unassisted, spontaneous ventilation, room air    Cardiovascular: stable and blood pressure at baseline    Hydration: euvolemic  "

## 2018-03-20 NOTE — L&D DELIVERY NOTE
[facility]  Vaginal Delivery   Operative Note    SUMMARY   Called for delivery, vtx at introitus  Normal spontaneous vaginal delivery of live female infant in one contraction with ease and was placed on mothers abdomen for skin to skin and bulb suctioning performed.  Infant delivered position OA over perineum.  Nuchal cord: Yes, cord reduced following delivery, as it was wrapped around head, shoulders, and right lower extremity     Spontaneous delivery of placenta and IV pitocin given noting good uterine tone.  1st degree laceration noted and repaired with 4-0 vicryl on SH  Patient tolerated delivery well. Sponge needle and lap counted correctly x2.\  Mother and baby stable     Indications:  (normal spontaneous vaginal delivery)  Pregnancy complicated by:   Patient Active Problem List   Diagnosis    Obesity    Osler-Weber-Rendu disease     (normal spontaneous vaginal delivery)    Perineal laceration    Single live      Admitting GA: 39w3d    Delivery Information for  Marcy Bay    Birth information:  YOB: 2020   Time of birth: 9:17 PM   Sex: female   Head Delivery Date/Time: 3/19/2018  9:17 PM   Delivery type: Vaginal, Spontaneous Delivery   Gestational Age: 39w3d    Delivery Providers    Delivering clinician:  Ayana Shaffer CNM   Provider Role    Emily Ellison RN Registered Nurse    Alysia Chen Surgical Tech    Cady Byrne RN Registered Nurse    Clarisse Webber RN Registered Nurse                [place] Assessment    Living status:  Living  Apgars:     1 Minute:   5 Minute:   10 Minute:   15 Minute:   20 Minute:     Skin Color:   1  1       Heart Rate:   2  2       Reflex Irritability:   2  2       Muscle Tone:   2  2       Respiratory Effort:   2  2       Total:   9  9               Apgars Assigned By:  CLARISSE WEBBER         Assisted Delivery Details:    Forceps attempted?:  No  Vacuum extractor attempted?:  No         Shoulder  Dystocia    Shoulder dystocia present?:  No           Presentation and Position    Presentation:  Vertex  Position:  Middle Occiput Anterior           Interventions/Resuscitation    Method:  Bulb Suctioning, Tactile Stimulation       Cord    Vessels:  3 vessels  Complications:  Nuchal, Body  Nuchal Intervention:  reduced  Nuchal Cord Description:  loose nuchal cord  Cord Around:  head, shoulders, right lower extremity  Number of Loops:  2  Delayed Cord Clamping?:  Yes  Cord Clamped Date/Time:  3/19/2018  9:19 PM  Cord Blood Disposition:  Lab  Gases Sent?:  No  Stem Cell Collection (by MD):  No       Placenta    Date and time:  3/19/2018  9:21 PM  Removal:  Spontaneous  Appearance:  Intact  Placenta disposition:  discarded           Labor Events:       labor:       Labor Onset Date/Time:         Dilation Complete Date/Time:         Start Pushing Date/Time:       Rupture Date/Time:              Rupture type:           Fluid Amount:        Fluid Color:        Fluid Odor:        Membrane Status (PeriCalm): SRM (Spontaneous Rupture)      Rupture Date/Time (PeriCalm): 2018 20:38:00      Fluid Amount (PeriCalm): Moderate      Fluid Color (PeriCalm): Clear       steroids:       Antibiotics given for GBS:       Induction:       Indications for induction:        Augmentation:       Indications for augmentation:       Labor complications:       Additional complications:          Cervical ripening:                     Delivery:      Episiotomy: None     Indication for Episiotomy:       Perineal Lacerations: 1st Repaired:  Yes   Periurethral Laceration: none Repaired:     Labial Laceration: none Repaired:     Sulcus Laceration: none Repaired:     Vaginal Laceration: No Repaired:     Cervical Laceration: No Repaired:     Repair suture:       Repair # of packets: 1     Vaginal delivery QBL (mL): 150      QBL (mL): 0     Combined Blood Loss (mL): 150     Vaginal Sweep Performed: Yes     Surgicount  Correct: No       Other providers:       Anesthesia    Method:  Epidural          Details (if applicable):  Trial of Labor      Categorization:      Priority:     Indications for :     Incision Type:       Additional  information:  Forceps:    Vacuum:    Breech:    Observed anomalies    Other (Comments):         ABIGAIL NAIR

## 2018-03-20 NOTE — TRANSFER OF CARE
"Anesthesia Transfer of Care Note    Patient: Angy Bay    Procedure(s) Performed: epidural    Patient location: Labor and Delivery    Anesthesia Type: epidural    Post pain: adequate analgesia    Post assessment: no apparent anesthetic complications    Post vital signs: stable    Level of consciousness: alert and awake    Nausea/Vomiting: no nausea/vomiting    Complications: none    Transfer of care protocol was followed      Last vitals:   Visit Vitals  /72   Pulse 86   Temp 36.5 °C (97.7 °F) (Axillary)   Resp 18   Ht 5' 3" (1.6 m)   Wt 102.5 kg (226 lb)   LMP 06/16/2017   SpO2 100%   Breastfeeding? No   BMI 40.03 kg/m²     "

## 2018-03-20 NOTE — ANESTHESIA POSTPROCEDURE EVALUATION
"Anesthesia Post Evaluation    Patient: Angy Bay    Procedure(s) Performed: epidural    Final Anesthesia Type: epidural  Patient location during evaluation: labor & delivery  Patient participation: Yes- Able to Participate  Level of consciousness: awake and alert  Post-procedure vital signs: reviewed and stable  Pain management: adequate  Airway patency: patent    Anesthetic complications: no      Cardiovascular status: hemodynamically stable  Respiratory status: unassisted, spontaneous ventilation and room air  Hydration status: euvolemic  Follow-up not needed.        Visit Vitals  /72   Pulse 86   Temp 36.5 °C (97.7 °F) (Axillary)   Resp 18   Ht 5' 3" (1.6 m)   Wt 102.5 kg (226 lb)   LMP 06/16/2017   SpO2 100%   Breastfeeding? No   BMI 40.03 kg/m²       Pain/Simon Score: Pain Rating Prior to Med Admin: 6 (3/19/2018  2:47 PM)      "

## 2018-03-21 VITALS
SYSTOLIC BLOOD PRESSURE: 143 MMHG | OXYGEN SATURATION: 100 % | WEIGHT: 226 LBS | BODY MASS INDEX: 40.04 KG/M2 | HEART RATE: 74 BPM | DIASTOLIC BLOOD PRESSURE: 86 MMHG | RESPIRATION RATE: 20 BRPM | TEMPERATURE: 98 F | HEIGHT: 63 IN

## 2018-03-21 PROCEDURE — 99238 HOSP IP/OBS DSCHRG MGMT 30/<: CPT | Mod: ,,, | Performed by: ADVANCED PRACTICE MIDWIFE

## 2018-03-21 PROCEDURE — 25000003 PHARM REV CODE 250: Performed by: PHYSICIAN ASSISTANT

## 2018-03-21 RX ORDER — OXYCODONE AND ACETAMINOPHEN 5; 325 MG/1; MG/1
1 TABLET ORAL EVERY 4 HOURS PRN
Qty: 20 TABLET | Refills: 0 | Status: SHIPPED | OUTPATIENT
Start: 2018-03-21 | End: 2018-04-18

## 2018-03-21 RX ADMIN — OXYCODONE HYDROCHLORIDE AND ACETAMINOPHEN 1 TABLET: 5; 325 TABLET ORAL at 09:03

## 2018-03-21 RX ADMIN — OXYCODONE HYDROCHLORIDE AND ACETAMINOPHEN 1 TABLET: 5; 325 TABLET ORAL at 02:03

## 2018-03-21 NOTE — DISCHARGE SUMMARY
[facility]   Obstetrics  Discharge Summary      Patient Name: Angy Bay  MRN: [id number]  Admission Date: 3/19/2018  Hospital Length of Stay: 2 days  Discharge Date and Time:  2018 10:37 AM  Attending Physician: BERNY Mclean MD   Discharging Provider: Coretta Cummins CNM  Primary Care Provider: Primary Doctor No    HPI: 3/19/18 @ 0001 IOL for Osler Hammond Rendu disease per [facility]   1630- Distressed and desires epidural    * No surgery found *     Hospital Course:   Admit to LD  Cytotec IOL   3/19/18 \1100hrs- Cat 1 strip with ctx's q2, uncomfortable- Iv hydration abd Stadol2 mg given  1148hrs- 3rd Cytotec placed  1545hrs- Cat 1  Strip with ctx's q2 - Options discussed  1630hrs Cat 1 strip with ctx's q2, states will have Cox but needs epidural first OK  1735hrs Cat 1 strip with ctx's q2-3 3/70/-3 Comfortable S/P epidural Start pitocin per protocol  1930hrs Cat 1 strip with ctx's q2-3 , pitocin at 6mu. Complete and +1 per RN. Sat up and allowed to labor down  : , routine pp care, routine perineal care  3/20/18 ppd1, routine care   3/21/18 ppd2, routine care. D/c home. Consulted Dr Redmond regarding [facility] recommendation for chest xray. 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] 2016 Yes      Problems Resolved During this Admission:    Diagnosis Date Noted Date Resolved POA    Normal labor [O80, Z37.9] 2018 Not Applicable    Obesity complicating pregnancy, third trimester [O99.213] 10/10/2017 2018 Yes            Feeding Method: bottle    Immunizations     Date Immunization Status Dose Route/Site Given by    18 3653 MMR Incomplete 0.5 mL Subcutaneous/Left deltoid     18  2309 Tdap Incomplete 0.5 mL Intramuscular/Left deltoid           Delivery:    Episiotomy: None   Lacerations: 1st   Repair suture:     Repair # of packets: 1   Blood loss (ml): 150     Birth information:  YOB: 2020   Time of birth: 9:17 PM   Sex: female   Delivery type: Vaginal, Spontaneous Delivery   Gestational Age: 39w3d    Delivery Clinician:      Other providers:       Additional  information:  Forceps:    Vacuum:    Breech:    Observed anomalies      Living?:           APGARS  One minute Five minutes Ten minutes   Skin color:         Heart rate:         Grimace:         Muscle tone:         Breathing:         Totals: 9  9        Placenta: Delivered:       appearance    Pending Diagnostic Studies:     None          Discharged Condition: good    Disposition: Home or Self Care    Follow Up:  Follow-up Information     Mary Zimmerman CNM In 4 weeks.    Specialty:  Obstetrics  Contact information:  [address] [place] [address] [phone]                 Patient Instructions:     Activity as tolerated       Medications:  Current Discharge Medication List      STOP taking these medications       acetaminophen (TYLENOL) 500 MG tablet Comments:   Reason for Stopping:         diphenhydrAMINE (BENADRYL) 25 mg capsule Comments:   Reason for Stopping:               Coretta Cummins CNM  Obstetrics  [facility]

## 2018-03-21 NOTE — DISCHARGE SUMMARY
[facility]   Obstetrics  Discharge Summary      Patient Name: Angy Bay  MRN: [id number]  Admission Date: 3/19/2018  Hospital Length of Stay: 2 days  Discharge Date and Time:  2018 10:38 AM  Attending Physician: BERNY Mclean MD   Discharging Provider: Coretta Cummins CNM  Primary Care Provider: Primary Doctor No    HPI: 3/19/18 @ 0001 IOL for Osler Hammond Rendu disease per [facility]   1630- Distressed and desires epidural    * No surgery found *     Hospital Course:   Admit to LD  Cytotec IOL   3/19/18 \1100hrs- Cat 1 strip with ctx's q2, uncomfortable- Iv hydration abd Stadol2 mg given  1148hrs- 3rd Cytotec placed  1545hrs- Cat 1  Strip with ctx's q2 - Options discussed  1630hrs Cat 1 strip with ctx's q2, states will have Cox but needs epidural first OK  1735hrs Cat 1 strip with ctx's q2-3 3/70/-3 Comfortable S/P epidural Start pitocin per protocol  1930hrs Cat 1 strip with ctx's q2-3 , pitocin at 6mu. Complete and +1 per RN. Sat up and allowed to labor down  : , routine pp care, routine perineal care  3/20/18 ppd1, routine care   3/21/18 ppd2, routine care. D/c home. Consulted Dr Redmond regarding [facility] recommendation for chest xray. 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] 2016 Yes      Problems Resolved During this Admission:    Diagnosis Date Noted Date Resolved POA    Normal labor [O80, Z37.9] 2018 Not Applicable    Obesity complicating pregnancy, third trimester [O99.213] 10/10/2017 2018 Yes            Feeding Method: bottle    Immunizations     Date Immunization Status Dose Route/Site Given by    18 5774 MMR Incomplete 0.5 mL Subcutaneous/Left deltoid     18  2309 Tdap Incomplete 0.5 mL Intramuscular/Left deltoid           Delivery:    Episiotomy: None   Lacerations: 1st   Repair suture:     Repair # of packets: 1   Blood loss (ml): 150     Birth information:  YOB: 2020   Time of birth: 9:17 PM   Sex: female   Delivery type: Vaginal, Spontaneous Delivery   Gestational Age: 39w3d    Delivery Clinician:      Other providers:       Additional  information:  Forceps:    Vacuum:    Breech:    Observed anomalies      Living?:           APGARS  One minute Five minutes Ten minutes   Skin color:         Heart rate:         Grimace:         Muscle tone:         Breathing:         Totals: 9  9        Placenta: Delivered:       appearance    Pending Diagnostic Studies:     None          Discharged Condition: good    Disposition: Home or Self Care    Follow Up:  Follow-up Information     Mary Zimmerman CNM In 4 weeks.    Specialty:  Obstetrics  Contact information:  [address] [place] [address] [phone]                 Patient Instructions:     Activity as tolerated       Medications:  Current Discharge Medication List      STOP taking these medications       acetaminophen (TYLENOL) 500 MG tablet Comments:   Reason for Stopping:         diphenhydrAMINE (BENADRYL) 25 mg capsule Comments:   Reason for Stopping:               Coretta Cummins CNM  Obstetrics  [facility]

## 2018-03-21 NOTE — ASSESSMENT & PLAN NOTE
Per MFM recommendations, del @ 39 weeks gestation  MRI negative for AVMs, recommend CT of lungs at postpartum  MFM cleared for vaginal delivery, consider vacuum extraction if prolonged second stage of labor  Anesthesia cleared pt for epidural during pregnancy  Cytotec IOL  R/B discussed with pt. And family, pt. Agrees to continue with POC  Consulted Dr Redmond regarding MFM recommendation for pp chest x ray.  Needs to be done at 6 weeks pp

## 2018-03-21 NOTE — DISCHARGE INSTRUCTIONS
Mother Self Care:    Activity: Avoid strenuous exercise and get adequate rest.  No driving until your physician gives you consent.  Emotional Changes: The grieving process has many different stages, be prepared to experience lots of emotional ups and downs. Identify people to be your support system, and do not hesitate to call our  if you need someone to talk to.   Breast Care: You may notice milk leaking from your breasts. Wear a support bra 24 hours a day for one week or wrap breasts in an ace bandage if needed to stop milk production.  Avoid stimulation to breasts.  You may use ice packs for discomfort.  Johanny-Care/Vaginal Bleeding: Remember to use your johanny-bottle after urinating.  Your flow will change from red, to pink, to yellow/white color over a period of 2 weeks.  Menstruation will return in 3-8 weeks.  Episiotomy Vaginal Delivery: Stitches will dissolve within 10 days to 3 weeks.  Warm baths, tucks, and dermoplast spray will promote healing.  Avoid bubble baths or strong soaps.  Sexual Activity/Pelvic Rest: No sexual activity, tampons, or douching until your physician gives you consent.  Diet: Continue to eat from the five basic food groups, including plenty of protein, fruits, vegetables, and whole grains.  Limit empty calories and high fat foods.  Drink enough fluids to satisfy thirst.  Constipation/Hemorrhoids: Drink plenty of water.  You may take a stool softener or natural laxative (Metamucil). You may use tucks or hemorrhoid ointment and soak in a warm tub.    CALL YOUR OB DOCTOR IF ANY OF THE FOLLOWING OCCURS:  *Heavy bleeding - saturating a pad an hour or passing any large (2-3 inches in size) blood clots.  *Any pain, redness, or tenderness in lower leg.  *You cannot care for yourself  *Any signs of infection-      - Temperature greater than 100.5 degrees F      - Foul smelling vaginal discharge      - Increased episiotomy or incisional pain      - Hot, hard, red or sore area on  breast      - Flu-like symptoms      - Any urgency, frequency or burning with urination

## 2018-03-21 NOTE — PLAN OF CARE
Problem: Patient Care Overview  Goal: Plan of Care Review  Outcome: Ongoing (interventions implemented as appropriate)  Patient progressing well. Bonding appropriately with . Pain controlled with Percocet. Ambulating independently and voiding without difficulty. Light vaginal bleeding. Vital signs stable. Will continue to monitor.         
Problem: Patient Care Overview  Goal: Plan of Care Review  Outcome: Ongoing (interventions implemented as appropriate)  Pt progressing well. Up ad james. L & D nurse Kelsey reports pt had a fall prior to coming over, pt denies any pain/discomfort at this time. Pain controlled with oral pain medication. Voids spontaneously without difficulty, one measured void pending. Fundus firm without massage. Bleeding moderate. Formula feeding. Bonding appropriately with baby. Will continue to monitor, VSS.        
Problem: Patient Care Overview  Goal: Plan of Care Review  Vital signs stable, bleeding remains light, fundus is firm without massage. Bonding well with baby.  Pain controlled with PO meds. Will continue to monitor.       
I personally performed the services described in the documentation, reviewed the documentation recorded by the scribe in my presence and it accurately and completely records my words and action.

## 2018-04-05 ENCOUNTER — TELEPHONE (OUTPATIENT)
Dept: OBSTETRICS AND GYNECOLOGY | Facility: CLINIC | Age: 23
End: 2018-04-05

## 2018-04-05 DIAGNOSIS — I78.0 HHT (HEREDITARY HEMORRHAGIC TELANGIECTASIA): Primary | ICD-10-CM

## 2018-04-05 NOTE — TELEPHONE ENCOUNTER
----- Message from Jhonathan Yan MD sent at 4/3/2018 11:07 AM CDT -----  Yes.  I would order CT pulmonary angiogram.      ----- Message -----  From: Melita Mcdowell MD  Sent: 4/2/2018   6:00 PM  To: Jhonathan Yan MD    Hi Jhonathan,  Pt has a history significant for Hereditary hemorrhagic telangiectasia  Or  Osler-Weber-Rendu syndrome. She recently deliverred uneventfully with us (with no pushes).   She never had a pulmonary workup to check for AVM there. What test would we order? Just CT angiogram or something more specialized?  Lissett

## 2018-04-05 NOTE — TELEPHONE ENCOUNTER
pls schedule her lung CT. She is aware she needs one - this is good timing as she is postpartum enough from her delivery.

## 2018-04-09 ENCOUNTER — TELEPHONE (OUTPATIENT)
Dept: OBSTETRICS AND GYNECOLOGY | Facility: CLINIC | Age: 23
End: 2018-04-09

## 2018-04-18 ENCOUNTER — POSTPARTUM VISIT (OUTPATIENT)
Dept: OBSTETRICS AND GYNECOLOGY | Facility: CLINIC | Age: 23
End: 2018-04-18
Payer: MEDICAID

## 2018-04-18 VITALS — BODY MASS INDEX: 37.23 KG/M2 | WEIGHT: 210.13 LBS | HEIGHT: 63 IN

## 2018-04-18 DIAGNOSIS — B96.89 BV (BACTERIAL VAGINOSIS): ICD-10-CM

## 2018-04-18 DIAGNOSIS — N76.0 BV (BACTERIAL VAGINOSIS): ICD-10-CM

## 2018-04-18 PROCEDURE — 99999 PR PBB SHADOW E&M-EST. PATIENT-LVL II: CPT | Mod: PBBFAC,,, | Performed by: ADVANCED PRACTICE MIDWIFE

## 2018-04-18 PROCEDURE — 99212 OFFICE O/P EST SF 10 MIN: CPT | Mod: PBBFAC | Performed by: ADVANCED PRACTICE MIDWIFE

## 2018-04-18 RX ORDER — METRONIDAZOLE 500 MG/1
500 TABLET ORAL EVERY 12 HOURS
Qty: 14 TABLET | Refills: 0 | Status: SHIPPED | OUTPATIENT
Start: 2018-04-18 | End: 2018-06-22

## 2018-04-18 NOTE — PROGRESS NOTES
22 y.o. female for postpartum visit.  Patient has no complaints.  Her delivery records were reviewed.  She is bottle feeding infant.    Exam  General - well appearing, no apparent distress  Abdomen - soft, non tender, non distended incision none.  Pelvic - normal external genitalia, any lacerations healing, one stitch remains intact               uterus non tender, appropriately sized, +BV noted  Extremeties - no edema    Assessment:  Encounter Diagnoses   Name Primary?    Routine postpartum follow-up Yes    BV (bacterial vaginosis)         F/u -epsom salt soaks, interested in paragard aware of SE/insertion, order form signed today. Will use condoms for now, abstinence preferred until IUD inserted. CT scan was ordered by Dr. Mcdowell, scheduled today. Pap 1/2016, annual appointments. rx metronidazole for BV. al

## 2018-04-19 ENCOUNTER — TELEPHONE (OUTPATIENT)
Dept: RADIOLOGY | Facility: HOSPITAL | Age: 23
End: 2018-04-19

## 2018-04-20 ENCOUNTER — HOSPITAL ENCOUNTER (OUTPATIENT)
Dept: RADIOLOGY | Facility: HOSPITAL | Age: 23
Discharge: HOME | End: 2018-04-20
Attending: OBSTETRICS & GYNECOLOGY
Payer: MEDICAID

## 2018-04-20 DIAGNOSIS — I78.0 HHT (HEREDITARY HEMORRHAGIC TELANGIECTASIA): ICD-10-CM

## 2018-04-20 PROCEDURE — 71270 CT THORAX DX C-/C+: CPT | Mod: 26,,, | Performed by: RADIOLOGY

## 2018-04-20 PROCEDURE — 25500020 PHARM REV CODE 255: Mod: PO | Performed by: OBSTETRICS & GYNECOLOGY

## 2018-04-20 PROCEDURE — 71270 CT THORAX DX C-/C+: CPT | Mod: TC,PO

## 2018-04-20 RX ADMIN — IOHEXOL 75 ML: 350 INJECTION, SOLUTION INTRAVENOUS at 08:04

## 2018-05-15 ENCOUNTER — TELEPHONE (OUTPATIENT)
Dept: OBSTETRICS AND GYNECOLOGY | Facility: CLINIC | Age: 23
End: 2018-05-15

## 2018-05-15 NOTE — TELEPHONE ENCOUNTER
----- Message from Kristen Tavera sent at 5/15/2018  1:00 PM CDT -----  Contact: pt   Pt stated she calling to find out about birth control, she can be reached at [phone] Thanks

## 2018-05-24 ENCOUNTER — TELEPHONE (OUTPATIENT)
Dept: OBSTETRICS AND GYNECOLOGY | Facility: CLINIC | Age: 23
End: 2018-05-24

## 2018-05-24 NOTE — TELEPHONE ENCOUNTER
Spoke to patient and she stated that her period started two days ago and it will not be on for 06/06/18 so she will need to reschedule. No available appointments soon. Scheduled it for next month on 5/22/18 at 1:00pm at the [facility] location. Patient verbalized understanding.

## 2018-05-24 NOTE — TELEPHONE ENCOUNTER
----- Message from Mabel Menchaca sent at 5/24/2018 11:33 AM CDT -----  Contact: self/[phone]  Would like to consult with nurse regarding changing appt on 6-6, please call back at [phone]. Thanks/ar

## 2018-06-19 ENCOUNTER — TELEPHONE (OUTPATIENT)
Dept: OBSTETRICS AND GYNECOLOGY | Facility: CLINIC | Age: 23
End: 2018-06-19

## 2018-06-19 NOTE — TELEPHONE ENCOUNTER
----- Message from Reji Iyer sent at 6/19/2018 10:03 AM CDT -----  Contact: pt   Pt would like to be worked in prior to 7/22 due to coming off cycle.         ..[phone] (home)

## 2018-06-19 NOTE — TELEPHONE ENCOUNTER
Spoke to patient to see when she stopped her menstrual cycle. Patient states that she is still bleeding slightly. Informed patient that it is okay and that she can keep her appointment since it is for Friday 06/22/18.

## 2018-06-22 ENCOUNTER — PROCEDURE VISIT (OUTPATIENT)
Dept: OBSTETRICS AND GYNECOLOGY | Facility: CLINIC | Age: 23
End: 2018-06-22
Payer: MEDICAID

## 2018-06-22 VITALS
SYSTOLIC BLOOD PRESSURE: 122 MMHG | DIASTOLIC BLOOD PRESSURE: 78 MMHG | BODY MASS INDEX: 36.08 KG/M2 | WEIGHT: 203.69 LBS

## 2018-06-22 DIAGNOSIS — Z30.430 ENCOUNTER FOR IUD INSERTION: Primary | ICD-10-CM

## 2018-06-22 PROCEDURE — 81025 URINE PREGNANCY TEST: CPT | Mod: PBBFAC,PO | Performed by: OBSTETRICS & GYNECOLOGY

## 2018-06-22 PROCEDURE — 58300 INSERT INTRAUTERINE DEVICE: CPT | Mod: PBBFAC,PO | Performed by: OBSTETRICS & GYNECOLOGY

## 2018-06-22 RX ADMIN — COPPER 1 EACH: 313.4 INTRAUTERINE DEVICE INTRAUTERINE at 01:06

## 2018-06-22 NOTE — PROCEDURES
Insertin of IUD-Today  Date/Time: 2018 1:59 PM  Performed by: FELICIA LAGOS  Authorized by: FELICIA LAGOS   Preparation: Patient was prepped and draped in the usual sterile fashion.  Local anesthesia used: no    Anesthesia:  Local anesthesia used: no    Sedation:  Patient sedated: no  Patient tolerance: Patient tolerated the procedure well with no immediate complications  Comments: CC: Paragard  IUD PLACEMENT    Angy Bay is a 22 y.o. female  presents for a Paragard IUD placement.    UPT is negative.        PRE-IUD PLACEMENT COUNSELING:  All contraceptive options were reviewed and the patient chooses a Paragard IUD.  The patient's history was reviewed and there are no contraindications to an IUD. The procedure and minimal risks of pain, bleeding, perforation, failure of function with resultant pregnancy (intrauterine or ectopic) and spontaneous expulsion were discussed. The benefits were explained. All questions were answered and the patient agrees to proceed. Consent was signed (scanned into computer).    EXAM:  Uterine Position: anteverted, normal size    PROCEDURE:  TIME OUT PERFORMED.  The cervix visualized with a speculum.  A single tooth tenaculum placed on the anterior cervix.  The uterus sounded to 8 cm using sterile technique.  A Paragard IUD was loaded and placed high in uterine fundus without difficulty using sterile technique.  The string was cut to 2cm length from exo cervix.  The tenaculum and speculum were removed. Adequate hemostasis noted.  The patient tolerated the procedure well.  EBL 1 mL.    ASSESSMENT:  1. Contraception management / IUD insertion.V25.0.    POST IUD PLACEMENT COUNSELING:  Manage post IUD placement pain with NSAIDs, Tylenol or Rx per [employer].  IUD danger signs and how to check the strings.  Removal in 10 years.    Counseling lasted approximately 15 minutes and all her questions were answered.    FOLLOW-UP: With me in 4-6 weeks for string check.

## 2018-07-23 ENCOUNTER — OFFICE VISIT (OUTPATIENT)
Dept: OBSTETRICS AND GYNECOLOGY | Facility: CLINIC | Age: 23
End: 2018-07-23
Payer: MEDICAID

## 2018-07-23 VITALS
BODY MASS INDEX: 36.49 KG/M2 | HEIGHT: 63 IN | SYSTOLIC BLOOD PRESSURE: 126 MMHG | DIASTOLIC BLOOD PRESSURE: 70 MMHG | WEIGHT: 205.94 LBS

## 2018-07-23 DIAGNOSIS — Z30.431 IUD CHECK UP: Primary | ICD-10-CM

## 2018-07-23 PROCEDURE — 99999 PR PBB SHADOW E&M-EST. PATIENT-LVL II: CPT | Mod: PBBFAC,,, | Performed by: OBSTETRICS & GYNECOLOGY

## 2018-07-23 PROCEDURE — 99212 OFFICE O/P EST SF 10 MIN: CPT | Mod: PBBFAC,PO | Performed by: OBSTETRICS & GYNECOLOGY

## 2018-07-23 PROCEDURE — 99212 OFFICE O/P EST SF 10 MIN: CPT | Mod: S$PBB,,, | Performed by: OBSTETRICS & GYNECOLOGY

## 2018-07-23 NOTE — PROGRESS NOTES
Subjective:       Patient ID: Angy Bay is a 22 y.o. female.    Chief Complaint:  string check      History of Present Illness  HPI  Pt is s/p Paragard insertion on 18 and is here for string check.  Reports no issues with paragard but does report some moodiness in the past 2 weeks.  Denies pain, bleeding, or dyspareunia.    GYN & OB History  Patient's last menstrual period was 2018.   Date of Last Pap: 2016    OB History    Para Term  AB Living   2 2 2     2   SAB TAB Ectopic Multiple Live Births         0 2      # Outcome Date GA Lbr Sherif/2nd Weight Sex Delivery Anes PTL Lv   2 Term 18 39w3d / 01:54 2.75 kg (6 lb 1 oz) F Vag-Spont EPI N JUAN CARLOS   1 Term 16 41w0d  3.035 kg (6 lb 11.1 oz) F Vag-Vacuum EPI N JUAN CARLOS      Complications: Oligohydramnios          Review of Systems  Review of Systems   Constitutional: Negative for activity change, appetite change, fatigue, fever and unexpected weight change.   Respiratory: Negative for shortness of breath.    Cardiovascular: Negative for chest pain, palpitations and leg swelling.   Gastrointestinal: Negative for abdominal pain, bloating, blood in stool, constipation, diarrhea, nausea and vomiting.   Genitourinary: Negative for dyspareunia, dysuria, flank pain, frequency, genital sores, hematuria, menorrhagia, menstrual problem, pelvic pain, vaginal bleeding, vaginal discharge, vaginal pain, dysmenorrhea, urinary incontinence and vaginal odor.   Musculoskeletal: Negative for back pain.   Neurological: Negative for syncope and headaches.   Psychiatric/Behavioral: Negative for depression. The patient is not nervous/anxious.            Objective:    Physical Exam:   Constitutional: She is oriented to person, place, and time. She appears well-developed and well-nourished. No distress.       Cardiovascular: Normal rate and regular rhythm.     Pulmonary/Chest: Effort normal.        Abdominal: Soft. Bowel sounds are normal. She  exhibits no distension. There is no tenderness.     Genitourinary: Vagina normal and uterus normal. Pelvic exam was performed with patient supine. There is no rash, tenderness, lesion or injury on the right labia. There is no rash, tenderness, lesion or injury on the left labia. Uterus is not deviated, not enlarged and not tender. Cervix is normal. Right adnexum displays no mass, no tenderness and no fullness. Left adnexum displays no mass, no tenderness and no fullness. No erythema, tenderness or bleeding in the vagina. No foreign body in the vagina. No signs of injury around the vagina. No vaginal discharge found. Cervix exhibits no motion tenderness, no discharge and no friability. Additional cervical findings: IUD strings visualized              Neurological: She is alert and oriented to person, place, and time.    Skin: Skin is warm and dry.    Psychiatric: She has a normal mood and affect. Her behavior is normal. Thought content normal.          Assessment:        1. IUD check up             Plan:      IUD check up  -    Pt doing well.  Strings visualized.  Pt counseled on side-effects associated with Paragard use.      Follow-up for annual exam.

## 2018-09-16 NOTE — PROGRESS NOTES
Doing well  GBS next visit  Only complaint is hip pain, recommendations made  PTL talk  Scheduled to see MFM again next month    
feet/ankle

## 2020-07-24 NOTE — NURSING
Comprehensive Psych Evaluation [address]    Date of Admission:  7/24/2020    Date of Service:  7/24/2020    CHIEF COMPLAINT:  I want detox and help with my depression     HISTORY OF PRESENT ILLNESS:  Vladimir Spangler is a 38 year old male with history of alcohol use for the last 20 years.  Patient reported for the last 10 years he has been drinking daily consuming 6-8 beers per day and half a pint of vodka.  Patient reported hot and cold flashes, shakes, some diarrhea, denies any craving.  He denies any seizure or delirium tremens but has experienced blackouts from his drinking.  Patient at present reported poor sleep difficulty falling and staying asleep for the last 2 months, appetite has been down with about 15 minutes lb weight loss and the last 3 months and energy level has been poor.  Patient stated “I do not feel like doing anything”.  He reported that level of ambition has been low.  He denies anhedonia.  Denies feeling helpless nor hopeless.  Denies any hallucination nor paranoia.  Denies any thoughts of wanting to harm himself.    PAST PSYCH and AODA HISTORY:      No previous in-patient psychiatric or AODA treatment.  Denies previous suicide attempt.  Reported history of cocaine and marijuana abuse.    PAST MEDICAL HISTORY:  No past medical history on file.    ALLERGIES:  No Known Allergies    FAMILY HISTORY:  Sister has depression, denies family history of AODA problem.    SOCIAL HISTORY:      He was born and raised in [place], [place].  Finished high school, he is currently living by himself, 1 daughter, denies any history of abuse growing.  He currently works as .    STRENGTHS:  Family is very supportive.    Review of Systems:      Complain of hot and cold flashes, some shakes, episode of diarrhea.  No headache, no chest pain, no shortness of breath, no dysphagia, no sore throat, no dizziness.        SCHEDULED MEDICATIONS:  • nicotine  1 patch Transdermal Daily   • gabapentin  300 mg Oral 3  Vital signs stable. Bleeding light and fundus firm without massage. Discharge teaching reviewed with patient and verbalizes understanding. Appears to be bonding well with baby. Taken to car by wheelchair with baby on lap by staff.    times per day          VITAL SIGNS:  Blood pressure 115/77, pulse (!) 104, temperature 98.4 °F (36.9 °C), temperature source Oral, resp. rate 18, height 5' 5\" (1.651 m), weight 63.2 kg, SpO2 98 %.     LABORATORY DATA:  Recent Results (from the past 24 hour(s))   Comprehensive Metabolic Panel    Collection Time: 07/24/20  1:10 PM   Result Value Ref Range    Fasting Status      Sodium 143 135 - 145 mmol/L    Potassium 3.7 3.4 - 5.1 mmol/L    Chloride 104 98 - 107 mmol/L    Carbon Dioxide 26 21 - 32 mmol/L    Anion Gap 17 10 - 20 mmol/L    Glucose 90 65 - 99 mg/dL    BUN 5 (L) 6 - 20 mg/dL    Creatinine 0.81 0.67 - 1.17 mg/dL    Glomerular Filtration Rate >90 >90 mL/min/1.73m2    BUN/ Creatinine Ratio 6 (L) 7 - 25    Bilirubin, Total 2.0 (H) 0.2 - 1.0 mg/dL    GOT/ (H) <=37 Units/L    Alkaline Phosphatase 127 (H) 45 - 117 Units/L    Albumin 4.0 3.6 - 5.1 g/dL    Protein, Total 8.0 6.4 - 8.2 g/dL    Globulin 4.0 2.0 - 4.0 g/dL    A/G Ratio 1.0 1.0 - 2.4    GPT/ (H) <64 Units/L    Calcium 8.7 8.4 - 10.2 mg/dL   Magnesium    Collection Time: 07/24/20  1:10 PM   Result Value Ref Range    Magnesium 2.5 (H) 1.7 - 2.4 mg/dL   Alcohol    Collection Time: 07/24/20  1:10 PM   Result Value Ref Range    Alcohol 298 (H) <3 mg/dL   Acetaminophen Level    Collection Time: 07/24/20  1:10 PM   Result Value Ref Range    Acetaminophen <2 (L) 10 - 30 mcg/mL   Salicylate Level    Collection Time: 07/24/20  1:10 PM   Result Value Ref Range    Salicylate 3.7 <=30.0 mg/dL   CBC with Automated Differential (performable only)    Collection Time: 07/24/20  1:10 PM   Result Value Ref Range    WBC 6.0 4.2 - 11.0 K/mcL    RBC 4.88 4.50 - 5.90 mil/mcL    HGB 17.9 (H) 13.0 - 17.0 g/dL    HCT 49.4 39.0 - 51.0 %    .2 (H) 78.0 - 100.0 fl    MCH 36.7 (H) 26.0 - 34.0 pg    MCHC 36.2 32.0 - 36.5 g/dL    RDW-CV 13.8 11.0 - 15.0 %     140 - 450 K/mcL    NRBC 0 <=0 /100 WBC    Neutrophil, Percent 69 %    Lymphocytes, Percent 15 %     Mono, Percent 13 %    Eosinophils, Percent 1 %    Basophils, Percent 1 %    Immature Granulocytes 1 %    Absolute Neutrophils 4.3 1.8 - 7.7 K/mcL    Absolute Lymphocytes 0.9 (L) 1.0 - 4.8 K/mcL    Absolute Monocytes 0.8 0.3 - 0.9 K/mcL    Absolute Eosinophils  0.1 0.1 - 0.5 K/mcL    Absolute Basophils 0.0 0.0 - 0.3 K/mcL    Absolute Immmature Granulocytes 0.0 0.0 - 0.2 K/mcL    RDW-SD 51.2 (H) 39.0 - 50.0 fL   Rapid SARS-CoV-2 by PCR    Collection Time: 07/24/20  1:20 PM   Result Value Ref Range    Rapid SARS-COV-2 by PCR Not Detected Not Detected    Isolation Guidelines     Drug Abuse Screen, Urine    Collection Time: 07/24/20  1:23 PM   Result Value Ref Range    Amphetamines, Urine Negative Negative    Barbiturates, Urine Negative Negative    Benzodiazepines, Urine Negative Negative    Cocaine/ Metabolite, Urine Negative Negative    Opiates, Urine Negative Negative    Phencyclidine, Urine Negative Negative    Cannabinoids, Urine Negative Negative          MENTAL STATUS:  He is fairly built, fairly nourished, poorly groomed, alert, oriented, mood was depressed and anxious, affect was appropriate, no paranoia, no hallucination.  No suicidal ideation.     ASSESSMENT:   Alcohol use disorder severe    Depressive disorder unspecified    Alcohol withdrawal uncomplicated     RECOMMENDATIONS:  Admitted under voluntary condition    Placed on CIWA protocol and medicated with lorazepam    Gabapentin 300 mg t.i.d., fluoxetine 20 mg daily,campral the the 333 mg  3 times daily, folic acid 1 mg daily, thiamine 100 mg daily x3 doses and multivitamin daily.  Risks and benefits of medications discussed with patient.    Will involve in AA and other unit activities to help maintain sobriety.    Case management consult to address disposition issues.          Abundio Castro MD

## 2021-06-10 ENCOUNTER — TELEPHONE (OUTPATIENT)
Dept: OBSTETRICS AND GYNECOLOGY | Facility: CLINIC | Age: 26
End: 2021-06-10

## 2021-06-11 ENCOUNTER — TELEPHONE (OUTPATIENT)
Dept: OBSTETRICS AND GYNECOLOGY | Facility: CLINIC | Age: 26
End: 2021-06-11

## 2021-06-16 ENCOUNTER — TELEPHONE (OUTPATIENT)
Dept: OBSTETRICS AND GYNECOLOGY | Facility: CLINIC | Age: 26
End: 2021-06-16

## 2021-06-24 ENCOUNTER — OFFICE VISIT (OUTPATIENT)
Dept: OBSTETRICS AND GYNECOLOGY | Facility: CLINIC | Age: 26
End: 2021-06-24
Payer: MEDICAID

## 2021-06-24 VITALS
SYSTOLIC BLOOD PRESSURE: 140 MMHG | DIASTOLIC BLOOD PRESSURE: 88 MMHG | WEIGHT: 198.63 LBS | HEIGHT: 63 IN | BODY MASS INDEX: 35.2 KG/M2

## 2021-06-24 DIAGNOSIS — Z12.4 PAPANICOLAOU SMEAR FOR CERVICAL CANCER SCREENING: ICD-10-CM

## 2021-06-24 DIAGNOSIS — Z30.432 ENCOUNTER FOR IUD REMOVAL: Primary | ICD-10-CM

## 2021-06-24 PROCEDURE — 99213 OFFICE O/P EST LOW 20 MIN: CPT | Mod: PBBFAC,25 | Performed by: NURSE PRACTITIONER

## 2021-06-24 PROCEDURE — 88175 CYTOPATH C/V AUTO FLUID REDO: CPT | Performed by: NURSE PRACTITIONER

## 2021-06-24 PROCEDURE — 99999 PR PBB SHADOW E&M-EST. PATIENT-LVL III: ICD-10-PCS | Mod: PBBFAC,,, | Performed by: NURSE PRACTITIONER

## 2021-06-24 PROCEDURE — 58301 REMOVE INTRAUTERINE DEVICE: CPT | Mod: S$PBB,,, | Performed by: NURSE PRACTITIONER

## 2021-06-24 PROCEDURE — 99999 PR PBB SHADOW E&M-EST. PATIENT-LVL III: CPT | Mod: PBBFAC,,, | Performed by: NURSE PRACTITIONER

## 2021-06-24 PROCEDURE — 58301 REMOVE INTRAUTERINE DEVICE: CPT | Mod: PBBFAC | Performed by: NURSE PRACTITIONER

## 2021-06-24 PROCEDURE — 58301 REMOVAL OF IUD: ICD-10-PCS | Mod: S$PBB,,, | Performed by: NURSE PRACTITIONER

## 2021-06-24 RX ORDER — HYDROXYZINE HYDROCHLORIDE 25 MG/1
25 TABLET, FILM COATED ORAL 3 TIMES DAILY
COMMUNITY
Start: 2021-05-06

## 2021-06-24 RX ORDER — BUTALBITAL, ACETAMINOPHEN AND CAFFEINE 50; 325; 40 MG/1; MG/1; MG/1
2 TABLET ORAL EVERY 8 HOURS PRN
COMMUNITY
Start: 2021-06-07

## 2021-06-24 RX ORDER — AMITRIPTYLINE HYDROCHLORIDE 50 MG/1
50-100 TABLET, FILM COATED ORAL NIGHTLY
COMMUNITY
Start: 2021-06-04 | End: 2023-12-07

## 2021-06-24 RX ORDER — FLUOXETINE HYDROCHLORIDE 20 MG/1
CAPSULE ORAL NIGHTLY
COMMUNITY
Start: 2021-06-04

## 2021-06-24 RX ORDER — ARIPIPRAZOLE 15 MG/1
15 TABLET ORAL NIGHTLY
COMMUNITY
Start: 2021-06-04

## 2021-06-24 RX ORDER — DEXTROAMPHETAMINE SACCHARATE, AMPHETAMINE ASPARTATE, DEXTROAMPHETAMINE SULFATE AND AMPHETAMINE SULFATE 5; 5; 5; 5 MG/1; MG/1; MG/1; MG/1
TABLET ORAL
COMMUNITY
Start: 2021-06-05 | End: 2023-12-07

## 2021-06-29 LAB
CLINICAL INFO: NORMAL
CYTO CVX: NORMAL
CYTOLOGIST CVX/VAG CYTO: NORMAL
CYTOLOGY CMNT CVX/VAG CYTO-IMP: NORMAL
CYTOLOGY PAP THIN PREP EXPLANATION: NORMAL
DATE OF PREVIOUS PAP: NORMAL
DATE PREVIOUS BX: NO
LMP START DATE: NORMAL
SPECIMEN SOURCE CVX/VAG CYTO: NORMAL
STAT OF ADQ CVX/VAG CYTO-IMP: NORMAL

## 2021-07-01 ENCOUNTER — PATIENT MESSAGE (OUTPATIENT)
Dept: ADMINISTRATIVE | Facility: OTHER | Age: 26
End: 2021-07-01

## 2023-12-06 ENCOUNTER — TELEPHONE (OUTPATIENT)
Dept: OBSTETRICS AND GYNECOLOGY | Facility: CLINIC | Age: 28
End: 2023-12-06
Payer: MEDICAID

## 2023-12-06 NOTE — TELEPHONE ENCOUNTER
Spoke with patient and pregnancy confirmation scheduled on 12/7/23 at 2 pm with KEY Deluna. She states she was advised yesterday at [facility] but does not know lmp as they are very irregular.

## 2023-12-06 NOTE — TELEPHONE ENCOUNTER
----- Message from Eryn Bacon sent at 12/6/2023  3:55 PM CST -----  Contact: JILLIAN GIFFORD [[id number]]  ..Type:  Patient Requesting Call    Who Called: JILLIAN GIFFORD [[id number]]  Does the patient know what this is regarding?:scheduling new preg appt, confirmed at urgent care 12/5  Would the patient rather a call back or a response via [facility]?  call  Best Call Back Number: [phone], pt mom number b/c pt doesn't have a phone right now   Additional Information:

## 2023-12-07 ENCOUNTER — OFFICE VISIT (OUTPATIENT)
Dept: OBSTETRICS AND GYNECOLOGY | Facility: CLINIC | Age: 28
End: 2023-12-07
Payer: MEDICAID

## 2023-12-07 ENCOUNTER — LAB VISIT (OUTPATIENT)
Dept: LAB | Facility: HOSPITAL | Age: 28
End: 2023-12-07
Payer: MEDICAID

## 2023-12-07 VITALS
BODY MASS INDEX: 38.39 KG/M2 | WEIGHT: 216.69 LBS | SYSTOLIC BLOOD PRESSURE: 122 MMHG | DIASTOLIC BLOOD PRESSURE: 86 MMHG | HEIGHT: 63 IN

## 2023-12-07 DIAGNOSIS — N89.8 VAGINAL DISCHARGE DURING PREGNANCY, FIRST TRIMESTER: ICD-10-CM

## 2023-12-07 DIAGNOSIS — O26.891 VAGINAL DISCHARGE DURING PREGNANCY, FIRST TRIMESTER: ICD-10-CM

## 2023-12-07 DIAGNOSIS — O26.841 UTERINE SIZE-DATE DISCREPANCY IN FIRST TRIMESTER: ICD-10-CM

## 2023-12-07 DIAGNOSIS — Z32.01 POSITIVE PREGNANCY TEST: ICD-10-CM

## 2023-12-07 DIAGNOSIS — F17.210 CIGARETTE SMOKER: ICD-10-CM

## 2023-12-07 DIAGNOSIS — Z32.01 POSITIVE PREGNANCY TEST: Primary | ICD-10-CM

## 2023-12-07 DIAGNOSIS — O21.9 NAUSEA/VOMITING IN PREGNANCY: ICD-10-CM

## 2023-12-07 DIAGNOSIS — Z11.3 SCREEN FOR STD (SEXUALLY TRANSMITTED DISEASE): ICD-10-CM

## 2023-12-07 DIAGNOSIS — Z32.00 POSSIBLE PREGNANCY: ICD-10-CM

## 2023-12-07 DIAGNOSIS — I78.0 OSLER-WEBER-RENDU DISEASE: ICD-10-CM

## 2023-12-07 LAB
ABO + RH BLD: NORMAL
AMPHET+METHAMPHET UR QL: NEGATIVE
ANION GAP SERPL CALC-SCNC: 15 MMOL/L (ref 8–16)
B-HCG UR QL: POSITIVE
BARBITURATES UR QL SCN>200 NG/ML: NEGATIVE
BASOPHILS # BLD AUTO: 0.04 K/UL (ref 0–0.2)
BASOPHILS NFR BLD: 0.3 % (ref 0–1.9)
BENZODIAZ UR QL SCN>200 NG/ML: NEGATIVE
BLD GP AB SCN CELLS X3 SERPL QL: NORMAL
BUN SERPL-MCNC: 9 MG/DL (ref 6–20)
BZE UR QL SCN: NEGATIVE
CALCIUM SERPL-MCNC: 9.7 MG/DL (ref 8.7–10.5)
CANNABINOIDS UR QL SCN: ABNORMAL
CHLORIDE SERPL-SCNC: 104 MMOL/L (ref 95–110)
CO2 SERPL-SCNC: 18 MMOL/L (ref 23–29)
CREAT SERPL-MCNC: 0.8 MG/DL (ref 0.5–1.4)
CREAT UR-MCNC: 328 MG/DL (ref 15–325)
CTP QC/QA: YES
DIFFERENTIAL METHOD: ABNORMAL
EOSINOPHIL # BLD AUTO: 0.1 K/UL (ref 0–0.5)
EOSINOPHIL NFR BLD: 0.8 % (ref 0–8)
ERYTHROCYTE [DISTWIDTH] IN BLOOD BY AUTOMATED COUNT: 12 % (ref 11.5–14.5)
EST. GFR  (NO RACE VARIABLE): >60 ML/MIN/1.73 M^2
GLUCOSE SERPL-MCNC: 91 MG/DL (ref 70–110)
HCG INTACT+B SERPL-ACNC: 185 MIU/ML
HCT VFR BLD AUTO: 44.8 % (ref 37–48.5)
HGB BLD-MCNC: 15.6 G/DL (ref 12–16)
IMM GRANULOCYTES # BLD AUTO: 0.05 K/UL (ref 0–0.04)
IMM GRANULOCYTES NFR BLD AUTO: 0.4 % (ref 0–0.5)
LYMPHOCYTES # BLD AUTO: 3.7 K/UL (ref 1–4.8)
LYMPHOCYTES NFR BLD: 25.9 % (ref 18–48)
MCH RBC QN AUTO: 32.5 PG (ref 27–31)
MCHC RBC AUTO-ENTMCNC: 34.8 G/DL (ref 32–36)
MCV RBC AUTO: 93 FL (ref 82–98)
METHADONE UR QL SCN>300 NG/ML: NEGATIVE
MONOCYTES # BLD AUTO: 0.8 K/UL (ref 0.3–1)
MONOCYTES NFR BLD: 5.4 % (ref 4–15)
NEUTROPHILS # BLD AUTO: 9.5 K/UL (ref 1.8–7.7)
NEUTROPHILS NFR BLD: 67.2 % (ref 38–73)
NRBC BLD-RTO: 0 /100 WBC
OPIATES UR QL SCN: NEGATIVE
PCP UR QL SCN>25 NG/ML: NEGATIVE
PLATELET # BLD AUTO: 349 K/UL (ref 150–450)
PMV BLD AUTO: 10.8 FL (ref 9.2–12.9)
POTASSIUM SERPL-SCNC: 3.7 MMOL/L (ref 3.5–5.1)
RBC # BLD AUTO: 4.8 M/UL (ref 4–5.4)
SODIUM SERPL-SCNC: 137 MMOL/L (ref 136–145)
SPECIMEN OUTDATE: NORMAL
TOXICOLOGY INFORMATION: ABNORMAL
WBC # BLD AUTO: 14.16 K/UL (ref 3.9–12.7)

## 2023-12-07 PROCEDURE — 99999 PR PBB SHADOW E&M-EST. PATIENT-LVL III: ICD-10-PCS | Mod: PBBFAC,,,

## 2023-12-07 PROCEDURE — 99999PBSHW POCT URINE PREGNANCY: ICD-10-PCS | Mod: PBBFAC,,,

## 2023-12-07 PROCEDURE — 99999 PR PBB SHADOW E&M-EST. PATIENT-LVL III: CPT | Mod: PBBFAC,,,

## 2023-12-07 PROCEDURE — 3008F BODY MASS INDEX DOCD: CPT | Mod: CPTII,,,

## 2023-12-07 PROCEDURE — 84702 CHORIONIC GONADOTROPIN TEST: CPT

## 2023-12-07 PROCEDURE — 4010F ACE/ARB THERAPY RXD/TAKEN: CPT | Mod: CPTII,,,

## 2023-12-07 PROCEDURE — 3074F SYST BP LT 130 MM HG: CPT | Mod: CPTII,,,

## 2023-12-07 PROCEDURE — 99214 OFFICE O/P EST MOD 30 MIN: CPT | Mod: S$PBB,TH,,

## 2023-12-07 PROCEDURE — 99213 OFFICE O/P EST LOW 20 MIN: CPT | Mod: PBBFAC,TH

## 2023-12-07 PROCEDURE — 81514 NFCT DS BV&VAGINITIS DNA ALG: CPT

## 2023-12-07 PROCEDURE — 85025 COMPLETE CBC W/AUTO DIFF WBC: CPT

## 2023-12-07 PROCEDURE — 1159F MED LIST DOCD IN RCRD: CPT | Mod: CPTII,,,

## 2023-12-07 PROCEDURE — 36415 COLL VENOUS BLD VENIPUNCTURE: CPT

## 2023-12-07 PROCEDURE — 87591 N.GONORRHOEAE DNA AMP PROB: CPT

## 2023-12-07 PROCEDURE — 80307 DRUG TEST PRSMV CHEM ANLYZR: CPT

## 2023-12-07 PROCEDURE — 87389 HIV-1 AG W/HIV-1&-2 AB AG IA: CPT

## 2023-12-07 PROCEDURE — 4010F PR ACE/ARB THEARPY RXD/TAKEN: ICD-10-PCS | Mod: CPTII,,,

## 2023-12-07 PROCEDURE — 86803 HEPATITIS C AB TEST: CPT

## 2023-12-07 PROCEDURE — 3079F DIAST BP 80-89 MM HG: CPT | Mod: CPTII,,,

## 2023-12-07 PROCEDURE — 80048 BASIC METABOLIC PNL TOTAL CA: CPT

## 2023-12-07 PROCEDURE — 3079F PR MOST RECENT DIASTOLIC BLOOD PRESSURE 80-89 MM HG: ICD-10-PCS | Mod: CPTII,,,

## 2023-12-07 PROCEDURE — 81025 URINE PREGNANCY TEST: CPT | Mod: PBBFAC

## 2023-12-07 PROCEDURE — 83020 HEMOGLOBIN ELECTROPHORESIS: CPT

## 2023-12-07 PROCEDURE — 87340 HEPATITIS B SURFACE AG IA: CPT

## 2023-12-07 PROCEDURE — 1159F PR MEDICATION LIST DOCUMENTED IN MEDICAL RECORD: ICD-10-PCS | Mod: CPTII,,,

## 2023-12-07 PROCEDURE — 86762 RUBELLA ANTIBODY: CPT

## 2023-12-07 PROCEDURE — 99214 PR OFFICE/OUTPT VISIT, EST, LEVL IV, 30-39 MIN: ICD-10-PCS | Mod: S$PBB,TH,,

## 2023-12-07 PROCEDURE — 3008F PR BODY MASS INDEX (BMI) DOCUMENTED: ICD-10-PCS | Mod: CPTII,,,

## 2023-12-07 PROCEDURE — 99999PBSHW POCT URINE PREGNANCY: Mod: PBBFAC,,,

## 2023-12-07 PROCEDURE — 86901 BLOOD TYPING SEROLOGIC RH(D): CPT

## 2023-12-07 PROCEDURE — 87086 URINE CULTURE/COLONY COUNT: CPT

## 2023-12-07 PROCEDURE — 3074F PR MOST RECENT SYSTOLIC BLOOD PRESSURE < 130 MM HG: ICD-10-PCS | Mod: CPTII,,,

## 2023-12-07 PROCEDURE — 86592 SYPHILIS TEST NON-TREP QUAL: CPT

## 2023-12-07 RX ORDER — ONDANSETRON 4 MG/1
4 TABLET, ORALLY DISINTEGRATING ORAL EVERY 6 HOURS PRN
Qty: 15 TABLET | Refills: 1 | Status: SHIPPED | OUTPATIENT
Start: 2023-12-07

## 2023-12-07 RX ORDER — HYDROXYZINE PAMOATE 25 MG/1
25 CAPSULE ORAL 3 TIMES DAILY
COMMUNITY
Start: 2023-09-25

## 2023-12-07 NOTE — PROGRESS NOTES
CHIEF COMPLAINT:   Patient presents with      Possible Pregnancy      C/C: amenorrhea  She  has not seen any other provider for this pregnancy    HPI: Reports amenorrhea since Patient's last menstrual period was 11/10/23 (approximate date).. Prior to LMP, menses were regular prior to this.  She is not currently on any contraception. + UPT on 23. Also reports nausea with vomiting. Has noticed breast tenderness. Denies vaginal bleeding since LMP.    SOCIAL HISTORY: Denies emotional/mental/physical/sexual violence or abuse. Feels safe at home. Accompanied today by her children.     PAP HISTORY: last pap 2021, results-NILM  denies any history of abnormal pap smear or STDs.     Reports  long-term chronic medical conditions     Patient Active Problem List   Diagnosis    Obesity    Osler-Weber-Rendu disease         HISTORY OF PRESENT ILLNESS  Angy Bay 27 y.o.  presents for pregnancy risk assessment.   The patient has no complaints today.  No nausea or vomiting. No bleeding or pain.  Pregnancy was not  planned but is desired.  Partner is supportive of pregnancy.  Lives at home with children and .  pets at home dogs.  Works at [employer].  Denies domestic abuse.  Denies chemical/pesticide/radiation exposure.    OB history: Vaginal deliveries no complications      OB History          3    Para   2    Term   2            AB        Living   2         SAB        IAB        Ectopic        Multiple   0    Live Births   2                   LMP: Patient's last menstrual period was 11/10/2023 (approximate).  EDC: Estimated Date of Delivery: None noted.  EGA: Unknown       Health Maintenance   Topic Date Due    Lipid Panel  Never done    Pap Smear  2024    TETANUS VACCINE  2028    Hepatitis C Screening  Completed       Past Medical History:   Diagnosis Date    Anxiety     Depression     Osler-Weber-Rendu disease        Past Surgical History:   Procedure Laterality Date     TONSILLECTOMY, ADENOIDECTOMY         Family History   Problem Relation Age of Onset    Liver disease Father     Breast cancer Neg Hx     Ovarian cancer Neg Hx     Deep vein thrombosis Neg Hx        Social History     Socioeconomic History    Marital status: Single   Tobacco Use    Smoking status: Every Day     Types: Cigarettes    Smokeless tobacco: Never   Substance and Sexual Activity    Alcohol use: No     Alcohol/week: 0.0 standard drinks of alcohol    Drug use: Yes     Types: Marijuana     Comment: marijuana,past     Sexual activity: Not Currently       Current Outpatient Medications   Medication Sig Dispense Refill    hydrOXYzine pamoate (VISTARIL) 25 MG Cap Take 25 mg by mouth 3 (three) times daily.      ARIPiprazole (ABILIFY) 15 MG Tab Take 15 mg by mouth nightly.      butalbital-acetaminophen-caffeine -40 mg (FIORICET, ESGIC) -40 mg per tablet Take 2 tablets by mouth every 8 (eight) hours as needed.      FLUoxetine 20 MG capsule Take by mouth nightly.      hydrOXYzine HCL (ATARAX) 25 MG tablet Take 25 mg by mouth 3 (three) times daily.      ondansetron (ZOFRAN-ODT) 4 MG TbDL Take 1 tablet (4 mg total) by mouth every 6 (six) hours as needed (nausea/vomiting). 15 tablet 1     No current facility-administered medications for this visit.       Review of patient's allergies indicates:  No Known Allergies      PHYSICAL EXAM   Vitals:    12/07/23 1407   BP: 122/86        PAIN SCALE: 0/10 None    PHYSICAL EXAM    ROS:  GENERAL: No fever, chills, fatigability or weight loss.  CV: Denies chest pain  PULM: Denies shortness of breath or wheezing.  ABDOMEN: Appetite fine. No weight loss. Denies diarrhea, abdominal pain, hematemesis or blood in stool.  URINARY: No flank pain, dysuria or hematuria.  REPRODUCTIVE: No abnormal vaginal bleeding.       PE:   APPEARANCE: Well nourished, well developed, in no acute distress  CHEST: Clear to auscultation bilaterally  CV: Regular rate and rhythm  BREASTS:  deferred  ABDOMEN: Soft. No tenderness or masses. No hepatosplenomegaly. No hernias  PELVIC:   VULVA: No lesions. Normal female genitalia.  URETHRAL MEATUS: Normal size and location, no lesions, no prolapse.  URETHRA: No masses, tenderness, prolapse or scarring.  VAGINA: Moist and well rugated, no discharge, no significant cystocele or rectocele.  CERVIX: No lesions, normal diameter, no stenosis, no cervical motion tenderness.   UTERUS: 6 w size, regular shape, mobile, non-tender, normal position, good support.  ADNEXA: No masses, tenderness or CDS nodularity.  ANUS PERINEUM: No lesions, no relaxation, no external hemorrhoids.     UPT +    A/P:     -      Patient was counseled today on A.C.S. Pap guidelines and recommendations for yearly pelvic exams, mammograms and monthly self breast exams; to see her PCP for other health maintenance and pregnancy.   -      Patient's medications and medical history reviewed with patient and implications in pregnancy.   -      Pregnancy course discussed and 'AtoZ' book given. Patient was counseled on proper weight gain based on the [place] [facility]'s recommendations based on her pre-pregnancy weight. Discussed foods to avoid in pregnancy (i.e. sushi, fish that are high in mercury, deli meat, and unpasteurized cheeses). Discussed prenatal vitamin options (i.e. stool softener, DHA). Discussed potential medical problems in pregnancy.  -     Discussed risk of Toxoplasmosis transmission from pets and reviewed risk reduction techniques.  -     Oriented to practice including CNM collaboration.   -     Follow-up initial OB, with labs and u/s.       Positive pregnancy test  -     Hepatitis C Antibody; Future; Expected date: 12/07/2023  -     HIV 1/2 Ag/Ab (4th Gen); Future; Expected date: 12/07/2023  -     RPR; Future; Expected date: 12/07/2023  -     Hepatitis B surface antigen; Future; Expected date: 12/07/2023  -     Type & Screen; Future; Expected date: 12/07/2023  -     Rubella  antibody, IgG; Future; Expected date: 12/07/2023  -     Urine culture  -     CBC auto differential; Future; Expected date: 12/07/2023  -     Basic metabolic panel; Future; Expected date: 12/07/2023  -     HCG, QUANTITATIVE, PREGNANCY; Future; Expected date: 12/07/2023  -     Drug screen panel, in-house  -     C. trachomatis/N. gonorrhoeae by AMP DNA [facility]; Vagina  -     Hemoglobin Electrophoresis,Hgb A2 [facility].; Future; Expected date: 12/07/2023    Possible pregnancy  -     POCT Urine Pregnancy    Uterine size-date discrepancy in first trimester  -     US OB/GYN Procedure ([facility])-Future    Vaginal discharge during pregnancy, first trimester  -     Vaginosis Screen by DNA Probe    Screen for STD (sexually transmitted disease)  -     C. trachomatis/N. gonorrhoeae by AMP DNA [facility]; Vagina    Nausea/vomiting in pregnancy  -     ondansetron (ZOFRAN-ODT) 4 MG TbDL; Take 1 tablet (4 mg total) by mouth every 6 (six) hours as needed (nausea/vomiting).  Dispense: 15 tablet; Refill: 1    Cigarette smoker    Osler-Weber-Rendu disease

## 2023-12-08 LAB
BACTERIA UR CULT: NORMAL
BACTERIA UR CULT: NORMAL
C TRACH DNA SPEC QL NAA+PROBE: NOT DETECTED
HBV SURFACE AG SERPL QL IA: NORMAL
HCV AB SERPL QL IA: NORMAL
HIV 1+2 AB+HIV1 P24 AG SERPL QL IA: NORMAL
N GONORRHOEA DNA SPEC QL NAA+PROBE: NOT DETECTED
RPR SER QL: NORMAL
RUBV IGG SER-ACNC: 96.4 IU/ML
RUBV IGG SER-IMP: REACTIVE

## 2023-12-10 LAB
BACTERIAL VAGINOSIS DNA: NEGATIVE
CANDIDA GLABRATA DNA: NEGATIVE
CANDIDA KRUSEI DNA: NEGATIVE
CANDIDA RRNA VAG QL PROBE: NEGATIVE
T VAGINALIS RRNA GENITAL QL PROBE: NEGATIVE

## 2023-12-13 LAB
HGB A2 MFR BLD HPLC: 2.5 % (ref 2.2–3.2)
HGB FRACT BLD ELPH-IMP: NORMAL
HGB FRACT BLD ELPH-IMP: NORMAL

## 2024-01-11 ENCOUNTER — LAB VISIT (OUTPATIENT)
Dept: LAB | Facility: HOSPITAL | Age: 29
End: 2024-01-11
Attending: MIDWIFE
Payer: MEDICAID

## 2024-01-11 ENCOUNTER — INITIAL PRENATAL (OUTPATIENT)
Dept: OBSTETRICS AND GYNECOLOGY | Facility: CLINIC | Age: 29
End: 2024-01-11
Payer: MEDICAID

## 2024-01-11 ENCOUNTER — PROCEDURE VISIT (OUTPATIENT)
Dept: OBSTETRICS AND GYNECOLOGY | Facility: CLINIC | Age: 29
End: 2024-01-11
Payer: MEDICAID

## 2024-01-11 VITALS — SYSTOLIC BLOOD PRESSURE: 124 MMHG | BODY MASS INDEX: 38.53 KG/M2 | DIASTOLIC BLOOD PRESSURE: 74 MMHG | WEIGHT: 217.5 LBS

## 2024-01-11 DIAGNOSIS — Z32.01 POSITIVE PREGNANCY TEST: ICD-10-CM

## 2024-01-11 DIAGNOSIS — Z86.79 HISTORY OF HYPERTENSION: ICD-10-CM

## 2024-01-11 DIAGNOSIS — Z87.59 HISTORY OF VACUUM EXTRACTION ASSISTED DELIVERY: ICD-10-CM

## 2024-01-11 DIAGNOSIS — Z86.59 HISTORY OF DEPRESSION: ICD-10-CM

## 2024-01-11 DIAGNOSIS — Z34.80 SUPERVISION OF OTHER NORMAL PREGNANCY, ANTEPARTUM: Primary | ICD-10-CM

## 2024-01-11 PROCEDURE — 99204 OFFICE O/P NEW MOD 45 MIN: CPT | Mod: TH,S$PBB,, | Performed by: MIDWIFE

## 2024-01-11 PROCEDURE — 76801 OB US < 14 WKS SINGLE FETUS: CPT | Mod: PBBFAC,PN | Performed by: OBSTETRICS & GYNECOLOGY

## 2024-01-11 PROCEDURE — 36415 COLL VENOUS BLD VENIPUNCTURE: CPT | Mod: PN | Performed by: MIDWIFE

## 2024-01-11 PROCEDURE — 99212 OFFICE O/P EST SF 10 MIN: CPT | Mod: PBBFAC,TH,PN,25 | Performed by: MIDWIFE

## 2024-01-11 PROCEDURE — 99999 PR PBB SHADOW E&M-EST. PATIENT-LVL II: CPT | Mod: PBBFAC,,, | Performed by: MIDWIFE

## 2024-01-11 RX ORDER — LISINOPRIL 10 MG/1
10 TABLET ORAL
COMMUNITY
Start: 2023-08-25

## 2024-01-11 RX ORDER — DEXTROAMPHETAMINE SACCHARATE, AMPHETAMINE ASPARTATE, DEXTROAMPHETAMINE SULFATE AND AMPHETAMINE SULFATE 7.5; 7.5; 7.5; 7.5 MG/1; MG/1; MG/1; MG/1
1 TABLET ORAL
COMMUNITY
Start: 2023-11-07

## 2024-01-11 RX ORDER — CARIPRAZINE 3 MG/1
3 CAPSULE, GELATIN COATED ORAL
COMMUNITY
Start: 2023-11-07

## 2024-01-11 RX ORDER — PROPRANOLOL HYDROCHLORIDE 20 MG/1
20 TABLET ORAL
COMMUNITY
Start: 2023-10-17

## 2024-01-11 RX ORDER — ONDANSETRON 4 MG/1
4 TABLET, ORALLY DISINTEGRATING ORAL 2 TIMES DAILY
Qty: 30 TABLET | Refills: 1 | Status: SHIPPED | OUTPATIENT
Start: 2024-01-11

## 2024-01-11 RX ORDER — TRAZODONE HYDROCHLORIDE 150 MG/1
150 TABLET ORAL NIGHTLY
COMMUNITY
Start: 2023-10-17

## 2024-01-11 RX ORDER — TRAZODONE HYDROCHLORIDE 100 MG/1
100 TABLET ORAL NIGHTLY
COMMUNITY
Start: 2023-08-23

## 2024-01-11 NOTE — PROGRESS NOTES
28 y.o. female  at 9w2d   Pt here for initial OB and dating US   Denies VB, LOF or cramping  Doing well overall, c/o N&V and fatigue--Zofran sent to pharmacy     Supervision of other normal pregnancy, antepartum   -routine PNC  - baby ASA at 15w  -genetic testing offered and accepted today     History of depression  - discontinued all psych meds with discovery of pregnancy, feels like she is doing ok without them     Osler-Weber-Rendu disease  -clotting order disease  -will consult with MFM in second trimester for recommendations        Reviewed warning signs, pregnancy precautions and how/when to call.  RTC x 4 wks, call or present sooner prn.

## 2024-01-16 ENCOUNTER — TELEPHONE (OUTPATIENT)
Dept: OBSTETRICS AND GYNECOLOGY | Facility: CLINIC | Age: 29
End: 2024-01-16
Payer: MEDICAID

## 2024-01-16 NOTE — TELEPHONE ENCOUNTER
----- Message from Rukhsana Blank sent at 1/16/2024  2:28 PM CST -----  Name of Who is Calling:  patient         What is the request in detail:  Patient calling to get an estimate on how long the genetics testing will take.         Can the clinic reply by [facility]:no           What Number to Call Back if not in [facility]: [phone]

## 2024-01-16 NOTE — TELEPHONE ENCOUNTER
Called patient and advised Mat 21 takes about 7-10 days for results to be received.  Patient verbalized understanding.

## 2024-01-22 ENCOUNTER — PATIENT MESSAGE (OUTPATIENT)
Dept: OBSTETRICS AND GYNECOLOGY | Facility: CLINIC | Age: 29
End: 2024-01-22
Payer: MEDICAID

## 2024-02-07 ENCOUNTER — TELEPHONE (OUTPATIENT)
Dept: OBSTETRICS AND GYNECOLOGY | Facility: CLINIC | Age: 29
End: 2024-02-07
Payer: MEDICAID

## 2024-02-07 ENCOUNTER — ROUTINE PRENATAL (OUTPATIENT)
Dept: OBSTETRICS AND GYNECOLOGY | Facility: CLINIC | Age: 29
End: 2024-02-07
Payer: MEDICAID

## 2024-02-07 VITALS
BODY MASS INDEX: 38.99 KG/M2 | DIASTOLIC BLOOD PRESSURE: 70 MMHG | WEIGHT: 220.13 LBS | SYSTOLIC BLOOD PRESSURE: 110 MMHG

## 2024-02-07 DIAGNOSIS — I78.0 OSLER-WEBER-RENDU DISEASE: Primary | ICD-10-CM

## 2024-02-07 DIAGNOSIS — Z86.79 HISTORY OF HYPERTENSION: ICD-10-CM

## 2024-02-07 DIAGNOSIS — Z34.80 SUPERVISION OF OTHER NORMAL PREGNANCY, ANTEPARTUM: ICD-10-CM

## 2024-02-07 PROCEDURE — 99213 OFFICE O/P EST LOW 20 MIN: CPT | Mod: TH,S$PBB,, | Performed by: MIDWIFE

## 2024-02-07 PROCEDURE — 99999 PR PBB SHADOW E&M-EST. PATIENT-LVL III: CPT | Mod: PBBFAC,,, | Performed by: MIDWIFE

## 2024-02-07 PROCEDURE — 99213 OFFICE O/P EST LOW 20 MIN: CPT | Mod: PBBFAC,TH,PN | Performed by: MIDWIFE

## 2024-02-07 RX ORDER — CLINDAMYCIN HYDROCHLORIDE 150 MG/1
CAPSULE ORAL
COMMUNITY
Start: 2024-01-26 | End: 2024-03-19

## 2024-02-07 RX ORDER — ASPIRIN 81 MG/1
81 TABLET ORAL DAILY
Qty: 150 TABLET | Refills: 2 | Status: SHIPPED | OUTPATIENT
Start: 2024-02-07 | End: 2025-02-06

## 2024-02-07 RX ORDER — METOCLOPRAMIDE 10 MG/1
TABLET ORAL
COMMUNITY
Start: 2024-01-26

## 2024-02-07 RX ORDER — ALBUTEROL SULFATE 90 UG/1
AEROSOL, METERED RESPIRATORY (INHALATION)
COMMUNITY
Start: 2024-01-18

## 2024-02-07 NOTE — PROGRESS NOTES
28 y.o. female  at 13w1d  Denies VB, LOF or cramping  Doing well overall, voices no complaints.      Supervision of other normal pregnancy, antepartum   -routine PNC  - begin baby ASA   -[id number] negative, pt aware      History of depression  - discontinued all psych meds with discovery of pregnancy, feels like she is doing ok without them      Osler-Weber-Rendu disease  -clotting order disease  -MFM referral order placed         Reviewed warning signs, pregnancy precautions and how/when to call.  RTC x 4 wks, call or present sooner prn.

## 2024-02-27 ENCOUNTER — PATIENT MESSAGE (OUTPATIENT)
Dept: OTHER | Facility: OTHER | Age: 29
End: 2024-02-27
Payer: MEDICAID

## 2024-03-05 ENCOUNTER — PATIENT MESSAGE (OUTPATIENT)
Dept: OTHER | Facility: OTHER | Age: 29
End: 2024-03-05
Payer: MEDICAID

## 2024-03-06 ENCOUNTER — ROUTINE PRENATAL (OUTPATIENT)
Dept: OBSTETRICS AND GYNECOLOGY | Facility: CLINIC | Age: 29
End: 2024-03-06
Payer: MEDICAID

## 2024-03-06 VITALS
SYSTOLIC BLOOD PRESSURE: 112 MMHG | BODY MASS INDEX: 39.05 KG/M2 | DIASTOLIC BLOOD PRESSURE: 76 MMHG | WEIGHT: 220.44 LBS

## 2024-03-06 DIAGNOSIS — Z86.59 HISTORY OF DEPRESSION: ICD-10-CM

## 2024-03-06 DIAGNOSIS — Z34.80 SUPERVISION OF OTHER NORMAL PREGNANCY, ANTEPARTUM: Primary | ICD-10-CM

## 2024-03-06 DIAGNOSIS — I78.0 OSLER-WEBER-RENDU DISEASE: ICD-10-CM

## 2024-03-06 PROCEDURE — 99999 PR PBB SHADOW E&M-EST. PATIENT-LVL III: CPT | Mod: PBBFAC,,, | Performed by: MIDWIFE

## 2024-03-06 PROCEDURE — 99213 OFFICE O/P EST LOW 20 MIN: CPT | Mod: PBBFAC,TH,PN | Performed by: MIDWIFE

## 2024-03-06 PROCEDURE — 99213 OFFICE O/P EST LOW 20 MIN: CPT | Mod: TH,S$PBB,, | Performed by: MIDWIFE

## 2024-03-06 NOTE — PROGRESS NOTES
28 y.o. female  at 17w1d  +FM, Denies VB, LOF or cramping  Doing well overall, has trouble sleeping, but states this is a regular occurrence for her-- ok to try Benadryl or Unisom      Supervision of other normal pregnancy, antepartum   -routine PNC  - on baby ASA     History of depression  - discontinued all psych meds with discovery of pregnancy, feels like she is doing ok without them      Osler-Weber-Rendu disease  -clotting order disease  -MFM consultation with anatomy scheduled for 3/19/24     Obesity in pregnancy   -TWG 3.5lbs  - baby ASA     Reviewed warning signs, pregnancy precautions and how/when to call.  RTC x 4 wks, call or present sooner prn.

## 2024-03-18 ENCOUNTER — TELEPHONE (OUTPATIENT)
Dept: OBSTETRICS AND GYNECOLOGY | Facility: CLINIC | Age: 29
End: 2024-03-18
Payer: MEDICAID

## 2024-03-19 ENCOUNTER — PROCEDURE VISIT (OUTPATIENT)
Dept: OBSTETRICS AND GYNECOLOGY | Facility: CLINIC | Age: 29
End: 2024-03-19
Payer: MEDICAID

## 2024-03-19 VITALS
SYSTOLIC BLOOD PRESSURE: 130 MMHG | HEIGHT: 63 IN | BODY MASS INDEX: 40.2 KG/M2 | DIASTOLIC BLOOD PRESSURE: 68 MMHG | WEIGHT: 226.88 LBS

## 2024-03-19 DIAGNOSIS — I78.0 OSLER-WEBER-RENDU DISEASE: ICD-10-CM

## 2024-03-19 DIAGNOSIS — Z86.79 HISTORY OF HYPERTENSION: ICD-10-CM

## 2024-03-19 DIAGNOSIS — J45.909 ASTHMA DURING PREGNANCY: ICD-10-CM

## 2024-03-19 DIAGNOSIS — O99.519 ASTHMA DURING PREGNANCY: ICD-10-CM

## 2024-03-19 DIAGNOSIS — O99.342 MENTAL DISORDER AFFECTING PREGNANCY IN SECOND TRIMESTER: ICD-10-CM

## 2024-03-19 DIAGNOSIS — O99.210 OBESITY IN PREGNANCY: Primary | ICD-10-CM

## 2024-03-19 PROCEDURE — 76811 OB US DETAILED SNGL FETUS: CPT | Mod: 26,S$PBB,, | Performed by: OBSTETRICS & GYNECOLOGY

## 2024-03-19 PROCEDURE — 76811 OB US DETAILED SNGL FETUS: CPT | Mod: PBBFAC,PO | Performed by: OBSTETRICS & GYNECOLOGY

## 2024-03-19 PROCEDURE — 99205 OFFICE O/P NEW HI 60 MIN: CPT | Mod: S$PBB,TH,, | Performed by: OBSTETRICS & GYNECOLOGY

## 2024-03-19 NOTE — PROGRESS NOTES
MATERNAL-FETAL MEDICINE   CONSULT NOTE    Provider requesting consultation: [place]    SUBJECTIVE:     Ms. Angy Bay is a 28 y.o.  female with IUP at 19w0d who is seen in consultation by MFM for evaluation and management of:  Problem   Asthma During Pregnancy   Mental Disorder Affecting Pregnancy in Second Trimester   History of Hypertension   Osler-Weber-Rendu Disease   Obesity in Pregnancy     Patient has no complaints today. She is overall feeling well.  Patient denies any contractions/cramping, vaginal bleeding or leakage of fluid.       Medication List with Changes/Refills   Current Medications    ALBUTEROL (PROVENTIL/VENTOLIN HFA) 90 MCG/ACTUATION INHALER    Inhale into the lungs.    ASPIRIN (ECOTRIN) 81 MG EC TABLET    Take 1 tablet (81 mg total) by mouth once daily.    ONDANSETRON (ZOFRAN-ODT) 4 MG TBDL    Take 1 tablet (4 mg total) by mouth 2 (two) times daily.             Review of patient's allergies indicates:  No Known Allergies    PMH:  Past Medical History:   Diagnosis Date    Anxiety     Bipolar disorder, unspecified     Depression     Insomnia disorder     Osler-Weber-Rendu disease     Panic attack      PObHx:  OB History    Para Term  AB Living   3 2 2     2   SAB IAB Ectopic Multiple Live Births         0 2      # Outcome Date GA Lbr Sherif/2nd Weight Sex Delivery Anes PTL Lv   3 Current            2 Term 18 39w3d / 01:54 2.75 kg (6 lb 1 oz) F Vag-Spont EPI N JUAN CARLOS   1 Term 16 41w0d  3.035 kg (6 lb 11.1 oz) F Vag-Vacuum EPI N JUAN CARLOS      Complications: Oligohydramnios     PSH:  Past Surgical History:   Procedure Laterality Date    TONSILLECTOMY, ADENOIDECTOMY         Family history:family history includes Liver disease in her father.    Social history: reports that she has been smoking cigarettes. She has never used smokeless tobacco. She reports that she does not currently use drugs after having used the following drugs: Marijuana. She reports that she does not drink  "alcohol.    Objective:   /68   Ht 5' 3" (1.6 m)   Wt 102.9 kg (226 lb 13.7 oz)   LMP 11/10/2023 (Approximate)   BMI 40.19 kg/m²     Physical Exam  deferred    Ultrasound performed. See viewpoint for full ultrasound report.  A detailed fetal anatomic ultrasound examination was performed today due to the following high risk indication: HHT.  No fetal structural abnormalities are identified today.  Fetal size is appropriate for established gestational age ( g).   Transabdominal cervical length is normal.   Placental location is anterior without evidence of previa.     Significant labs/imagin2018  IMPRESSION:   1. Negative CT of the chest with and without contrast.  No evidence for pulmonary AVMs.    ASSESSMENT/PLAN:     28 y.o.  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 However, we discussed that most pregnancies in women with HHT proceed without significant events, thought to be especially true for patients with a pre-pregnancy diagnosis who have been appropriately screened. Suad et al (BJOG 2008) reports a 1% risk of life-threatening events during pregnancy, including major PAVM bleed and stroke. They also report that in women with a life-threatening event, prior awareness of HHT is associated with improved survival (P=0.041). However, all women with this diagnosis should be followed by a high-risk provider during pregnancy and require close monitoring for signs/symptoms of possible events.   We discussed that current recommendations for screening in patients with HHT prior to pregnancy include pre-pregnancy chest CT to screen for and treat PAVMs and a brain MRI to screen for cerebral AVM's. She has previously undergone both of these tests " "and has no evidence of significant pulmonary or cerebral AVM's. Of note, recommendations is to rescreen for PAVMs every 5 years (hers was last almost 6 years ago).   She has not yet had a spinal MRI. She had an uncomplicated epidural in her last pregnancy. Consider discussing again with anesthesiology if spinal MRI is desired.  For all pregnant women with HHT, antibiotic prophylaxis during delivery is recommended following American Heart Association guidelines for the prevention of infective endocarditis. If patient develops hemoptysis or sudden dyspnea, then urgent hospitalization is encouraged.  We further discussed that HHT is a genetic disorder, usually inherited in an autosomal dominant manner, and would confer a 50% chance of having an offspring affected by this disease. She reports her prior two offspring have been tested and are negative. She defers wanting prenatal testing for this.  Patient reports minimal changes since her last pregnancy. She continues with occasional epistaxis, but these have not been severe nor more frequent. Aware of possibility of worsening during pregnancy.    Recommendations:  Continue monitoring for worsening epistaxis  Hemoptysis of any degree or sudden severe dyspnea should be considered a medical emergency, prompting urgent hospitalization and institution of appropriate treatment.  A prolonged second stage of labor should be avoided if possible. "Laboring down" should be considered. CS should be reserved for routine indications.  In keeping with the general advice for patients with PAVMs and/or HHT, antibiotic prophylaxis should be provided during delivery.  Anesthesia consult in 3rd trimester to review risks  Growth/MD visit in 3rd trimester  Patient would like to defer rescreen for pulmonary AVM until after pregnancy. Will defer to primary provider.      Obesity in pregnancy  Management per primary OB provider      Mental disorder affecting pregnancy in second " trimester  Patient reports a hx of depression, anxiety and bipolar disorder.   She was previously see by Dr. Chicas at [facility].  Was previously on Abilify, Ritalin, fluoxetine, vistaril, propranolol, trazadone and vraylar. She reports stopping these in the first trimester.  Reports good mood currently. Does continue under the care of her therapist in the same system.  Will defer management per primary OB provider as we were not officially consulted on this. If further counseling/recommendations are needed, please feel free to place an MFM consult for this.      Asthma during pregnancy  Reports occasional albuterol use. No hospitalization.  Asymptomatic today.  Avoid use of hemabate.  Management per primary provider      History of hypertension  Patient reports elevated BPs while on Adderall. Was placed on lisinopril for this reason. No evidence of CHTN outside of this.   We briefly discussed risk of lisinopril exposure in pregnancy.   Detailed anatomy scan does not reveal an abnormalities today.  Will performed follow up US to review anatomy and growth.   Would not treat as CHTN      Patient was counseled that prenatal ultrasound studies have limitations. They do not detect all fetal, genetic, placental, and maternal abnormalities.   Patient's other comorbidites were not addressed in today's visit.  If primary OB provider would like MFM input on these, please feel free to reconsult as clinically indicated.  Otherwise, will defer management to primary OB provider      FOLLOW UP:   A follow up ultrasound will be made for 30 weeks' gestation. A follow up MFM MD visit will be made for same day.       The patient was given an opportunity to ask questions about the management of her high risk pregnancy problems. She expressed an understanding of and agreement to the above impression and plan. All questions were answered to her satisfaction.    65 minutes of total time spent on the encounter, which  includes face to face time and non-face to face time preparing to see the patient (eg, review of tests), obtaining and/or reviewing separately obtained history, documenting clinical information in the electronic or other health record, independently interpreting results (not separately reported) and communicating results to the patient/family/caregiver, or care coordination (not separately reported).        Conrado Gutierrez MD   Maternal-Fetal Medicine      Electronically Signed by Conrado Gutierrez March 19, 2024

## 2024-03-19 NOTE — ASSESSMENT & PLAN NOTE
Patient reports a hx of depression, anxiety and bipolar disorder.   She was previously see by Dr. Chicas at [facility].  Was previously on Abilify, Ritalin, fluoxetine, vistaril, propranolol, trazadone and vraylar. She reports stopping these in the first trimester.  Reports good mood currently. Does continue under the care of her therapist in the same system.  Will defer management per primary OB provider as we were not officially consulted on this. If further counseling/recommendations are needed, please feel free to place an MFM consult for this.

## 2024-03-19 NOTE — ASSESSMENT & PLAN NOTE
Patient reports elevated BPs while on Adderall. Was placed on lisinopril for this reason. No evidence of CHTN outside of this.   We briefly discussed risk of lisinopril exposure in pregnancy.   Detailed anatomy scan does not reveal an abnormalities today.  Will performed follow up US to review anatomy and growth.   Would not treat as CHTN

## 2024-03-19 NOTE — ASSESSMENT & PLAN NOTE
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 However, we discussed that most pregnancies in women with HHT proceed without significant events, thought to be especially true for patients with a pre-pregnancy diagnosis who have been appropriately screened. Suad et al (BJOG 2008) reports a 1% risk of life-threatening events during pregnancy, including major PAVM bleed and stroke. They also report that in women with a life-threatening event, prior awareness of HHT is associated with improved survival (P=0.041). However, all women with this diagnosis should be followed by a high-risk provider during pregnancy and require close monitoring for signs/symptoms of possible events.   We discussed that current recommendations for screening in patients with HHT prior to pregnancy include pre-pregnancy chest CT to screen for and treat PAVMs and a brain MRI to screen for cerebral AVM's. She has previously undergone both of these tests and has no evidence of significant pulmonary or cerebral AVM's. Of note, recommendations is to rescreen for PAVMs every 5 years (hers was last almost 6 years ago).   She has not yet had a spinal MRI. She had an uncomplicated epidural in her last pregnancy. Consider discussing again with anesthesiology if spinal MRI is desired.  For all pregnant women with HHT, antibiotic prophylaxis during delivery is recommended following American Heart Association guidelines for the prevention of infective endocarditis. If patient develops hemoptysis or sudden dyspnea, then urgent hospitalization is encouraged.  We further discussed that HHT is a genetic disorder, usually inherited in an autosomal dominant manner, and would confer a 50% chance of having an offspring affected by this disease. She reports her prior two offspring  "have been tested and are negative. She defers wanting prenatal testing for this.  Patient reports minimal changes since her last pregnancy. She continues with occasional epistaxis, but these have not been severe nor more frequent. Aware of possibility of worsening during pregnancy.    Recommendations:  Continue monitoring for worsening epistaxis  Hemoptysis of any degree or sudden severe dyspnea should be considered a medical emergency, prompting urgent hospitalization and institution of appropriate treatment.  A prolonged second stage of labor should be avoided if possible. "Laboring down" should be considered. CS should be reserved for routine indications.  In keeping with the general advice for patients with PAVMs and/or HHT, antibiotic prophylaxis should be provided during delivery.  Anesthesia consult in 3rd trimester to review risks  Growth/MD visit in 3rd trimester  Patient would like to defer rescreen for pulmonary AVM until after pregnancy. Will defer to primary provider.    "

## 2024-03-19 NOTE — ASSESSMENT & PLAN NOTE
Reports occasional albuterol use. No hospitalization.  Asymptomatic today.  Avoid use of hemabate.  Management per primary provider

## 2024-03-26 ENCOUNTER — PATIENT MESSAGE (OUTPATIENT)
Dept: OTHER | Facility: OTHER | Age: 29
End: 2024-03-26
Payer: MEDICAID

## 2024-04-08 ENCOUNTER — TELEPHONE (OUTPATIENT)
Dept: OBSTETRICS AND GYNECOLOGY | Facility: CLINIC | Age: 29
End: 2024-04-08
Payer: MEDICAID

## 2024-04-08 NOTE — TELEPHONE ENCOUNTER
Returned pt call confirmed pt identifiers scheduled pt for [facility] with [facility] in [place] for4/9 pt voiced understanding of appt details.

## 2024-04-08 NOTE — TELEPHONE ENCOUNTER
----- Message from Cha Carreno sent at 4/8/2024  8:15 AM CDT -----  Patient isn't sure when her next appointment is or when is should be. Call back number [phone]. x. EL

## 2024-04-09 ENCOUNTER — ROUTINE PRENATAL (OUTPATIENT)
Dept: OBSTETRICS AND GYNECOLOGY | Facility: CLINIC | Age: 29
End: 2024-04-09
Payer: MEDICAID

## 2024-04-09 VITALS
BODY MASS INDEX: 40.24 KG/M2 | WEIGHT: 227.19 LBS | SYSTOLIC BLOOD PRESSURE: 132 MMHG | DIASTOLIC BLOOD PRESSURE: 82 MMHG

## 2024-04-09 DIAGNOSIS — O99.210 OBESITY IN PREGNANCY: ICD-10-CM

## 2024-04-09 DIAGNOSIS — O99.342 MENTAL DISORDER AFFECTING PREGNANCY IN SECOND TRIMESTER: ICD-10-CM

## 2024-04-09 DIAGNOSIS — O99.519 ASTHMA DURING PREGNANCY: ICD-10-CM

## 2024-04-09 DIAGNOSIS — Z86.79 HISTORY OF HYPERTENSION: ICD-10-CM

## 2024-04-09 DIAGNOSIS — Z34.80 SUPERVISION OF OTHER NORMAL PREGNANCY, ANTEPARTUM: Primary | ICD-10-CM

## 2024-04-09 DIAGNOSIS — I78.0 OSLER-WEBER-RENDU DISEASE: ICD-10-CM

## 2024-04-09 DIAGNOSIS — I78.0 OSLER-WEBER-RENDU DISEASE: Primary | ICD-10-CM

## 2024-04-09 DIAGNOSIS — J45.909 ASTHMA DURING PREGNANCY: ICD-10-CM

## 2024-04-09 PROCEDURE — 99999 PR PBB SHADOW E&M-EST. PATIENT-LVL III: CPT | Mod: PBBFAC,,, | Performed by: MIDWIFE

## 2024-04-09 PROCEDURE — 99213 OFFICE O/P EST LOW 20 MIN: CPT | Mod: TH,S$PBB,, | Performed by: MIDWIFE

## 2024-04-09 PROCEDURE — 99213 OFFICE O/P EST LOW 20 MIN: CPT | Mod: PBBFAC,TH,PN | Performed by: MIDWIFE

## 2024-04-10 NOTE — PROGRESS NOTES
28 y.o. female  at 22w0d  +FM, Denies VB, LOF or cramping  Doing ok, feeling more laurent. Still off her psych medications.     Supervision of other normal pregnancy, antepartum   -routine PNC  - on baby ASA  -third trimester labs discussed and ordered, will complete at [facility]      History of depression  - discontinued all psych meds with discovery of pregnancy, feels like she is doing ok without them   - Notify for worsening symptoms or if would like to resume medications  - we discussed r/b of use in pregnancy and she would like to remain off at this time    Osler-Weber-Rendu disease  -MFM consulted, see full note for recommendations  - will return at 30w to f/u anatomy scan     Obesity in pregnancy   -baby asa      Reviewed warning signs, pregnancy precautions and how/when to call.  RTC x 4 wks, call or present sooner prn.

## 2024-04-23 ENCOUNTER — PATIENT MESSAGE (OUTPATIENT)
Dept: OTHER | Facility: OTHER | Age: 29
End: 2024-04-23
Payer: MEDICAID

## 2024-05-07 ENCOUNTER — PATIENT MESSAGE (OUTPATIENT)
Dept: OTHER | Facility: OTHER | Age: 29
End: 2024-05-07
Payer: MEDICAID

## 2024-05-16 ENCOUNTER — LAB VISIT (OUTPATIENT)
Dept: LAB | Facility: HOSPITAL | Age: 29
End: 2024-05-16
Attending: MIDWIFE
Payer: MEDICAID

## 2024-05-16 ENCOUNTER — ROUTINE PRENATAL (OUTPATIENT)
Dept: OBSTETRICS AND GYNECOLOGY | Facility: CLINIC | Age: 29
End: 2024-05-16
Payer: MEDICAID

## 2024-05-16 VITALS — BODY MASS INDEX: 40.03 KG/M2 | SYSTOLIC BLOOD PRESSURE: 110 MMHG | DIASTOLIC BLOOD PRESSURE: 68 MMHG | WEIGHT: 226 LBS

## 2024-05-16 DIAGNOSIS — Z34.80 SUPERVISION OF OTHER NORMAL PREGNANCY, ANTEPARTUM: Primary | ICD-10-CM

## 2024-05-16 DIAGNOSIS — Z34.80 SUPERVISION OF OTHER NORMAL PREGNANCY, ANTEPARTUM: ICD-10-CM

## 2024-05-16 DIAGNOSIS — J45.909 ASTHMA DURING PREGNANCY: ICD-10-CM

## 2024-05-16 DIAGNOSIS — O99.210 OBESITY IN PREGNANCY: ICD-10-CM

## 2024-05-16 DIAGNOSIS — I78.0 OSLER-WEBER-RENDU DISEASE: ICD-10-CM

## 2024-05-16 DIAGNOSIS — Z23 NEED FOR TDAP VACCINATION: ICD-10-CM

## 2024-05-16 DIAGNOSIS — O99.519 ASTHMA DURING PREGNANCY: ICD-10-CM

## 2024-05-16 LAB
BASOPHILS # BLD AUTO: 0.02 K/UL (ref 0–0.2)
BASOPHILS NFR BLD: 0.2 % (ref 0–1.9)
DIFFERENTIAL METHOD BLD: ABNORMAL
EOSINOPHIL # BLD AUTO: 0.2 K/UL (ref 0–0.5)
EOSINOPHIL NFR BLD: 2 % (ref 0–8)
ERYTHROCYTE [DISTWIDTH] IN BLOOD BY AUTOMATED COUNT: 12.7 % (ref 11.5–14.5)
GLUCOSE SERPL-MCNC: 98 MG/DL (ref 70–140)
HCT VFR BLD AUTO: 37.2 % (ref 37–48.5)
HGB BLD-MCNC: 12.5 G/DL (ref 12–16)
HIV 1+2 AB+HIV1 P24 AG SERPL QL IA: NORMAL
IMM GRANULOCYTES # BLD AUTO: 0.06 K/UL (ref 0–0.04)
IMM GRANULOCYTES NFR BLD AUTO: 0.5 % (ref 0–0.5)
LYMPHOCYTES # BLD AUTO: 2.2 K/UL (ref 1–4.8)
LYMPHOCYTES NFR BLD: 18.9 % (ref 18–48)
MCH RBC QN AUTO: 32.4 PG (ref 27–31)
MCHC RBC AUTO-ENTMCNC: 33.6 G/DL (ref 32–36)
MCV RBC AUTO: 96 FL (ref 82–98)
MONOCYTES # BLD AUTO: 0.7 K/UL (ref 0.3–1)
MONOCYTES NFR BLD: 6 % (ref 4–15)
NEUTROPHILS # BLD AUTO: 8.4 K/UL (ref 1.8–7.7)
NEUTROPHILS NFR BLD: 72.4 % (ref 38–73)
NRBC BLD-RTO: 0 /100 WBC
PLATELET # BLD AUTO: 287 K/UL (ref 150–450)
PMV BLD AUTO: 11 FL (ref 9.2–12.9)
RBC # BLD AUTO: 3.86 M/UL (ref 4–5.4)
WBC # BLD AUTO: 11.65 K/UL (ref 3.9–12.7)

## 2024-05-16 PROCEDURE — 99213 OFFICE O/P EST LOW 20 MIN: CPT | Mod: PBBFAC,TH,PN,25 | Performed by: MIDWIFE

## 2024-05-16 PROCEDURE — 87389 HIV-1 AG W/HIV-1&-2 AB AG IA: CPT | Performed by: MIDWIFE

## 2024-05-16 PROCEDURE — 86592 SYPHILIS TEST NON-TREP QUAL: CPT | Performed by: MIDWIFE

## 2024-05-16 PROCEDURE — 85025 COMPLETE CBC W/AUTO DIFF WBC: CPT | Performed by: MIDWIFE

## 2024-05-16 PROCEDURE — 90715 TDAP VACCINE 7 YRS/> IM: CPT | Mod: PBBFAC,PN

## 2024-05-16 PROCEDURE — 99213 OFFICE O/P EST LOW 20 MIN: CPT | Mod: TH,S$PBB,, | Performed by: MIDWIFE

## 2024-05-16 PROCEDURE — 36415 COLL VENOUS BLD VENIPUNCTURE: CPT | Mod: PN | Performed by: MIDWIFE

## 2024-05-16 PROCEDURE — 99999 PR PBB SHADOW E&M-EST. PATIENT-LVL III: CPT | Mod: PBBFAC,,, | Performed by: MIDWIFE

## 2024-05-16 PROCEDURE — 90471 IMMUNIZATION ADMIN: CPT | Mod: PBBFAC,PN

## 2024-05-16 PROCEDURE — 99999PBSHW PR PBB SHADOW TECHNICAL ONLY FILED TO HB: Mod: PBBFAC,,,

## 2024-05-16 PROCEDURE — 82950 GLUCOSE TEST: CPT | Performed by: MIDWIFE

## 2024-05-16 RX ADMIN — TETANUS TOXOID, REDUCED DIPHTHERIA TOXOID AND ACELLULAR PERTUSSIS VACCINE, ADSORBED 0.5 ML: 5; 2.5; 8; 8; 2.5 SUSPENSION INTRAMUSCULAR at 09:05

## 2024-05-16 NOTE — PROGRESS NOTES
28 y.o. female  at 27w2d  +FM, Denies VB, LOF or cramping  Reports she is either moving to [place] or out of state, states currently living with her mom and sister and is having to move out. Coping ok. Denies need for medication or additional treatment.      Supervision of other normal pregnancy, antepartum   -routine PNC  - on baby ASA  -third trimester labs being completed today      History of depression  - discontinued all psych meds with discovery of pregnancy, feels like she is doing ok without them   - Notify for worsening symptoms or if would like to resume medications     Osler-Weber-Rendu disease  -in consultation with MFM  -has upcoming apt 24, will complete anatomy   -del recs in previous MFM note     Obesity in pregnancy   -baby asa      Reviewed warning signs, pregnancy precautions and how/when to call.  RTC x 4 wks, call or present sooner prn.

## 2024-05-16 NOTE — PROGRESS NOTES
Two pt identifiers identified   Patient notified to wait 15 minutes after recieiving injection, patient verbalized understanding.   Tdap  administered IM to patients left  deltoid.   Patient tolerated well.  Next injection scheduled.

## 2024-05-17 LAB — RPR SER QL: NORMAL

## 2024-05-21 ENCOUNTER — PATIENT MESSAGE (OUTPATIENT)
Dept: OTHER | Facility: OTHER | Age: 29
End: 2024-05-21
Payer: MEDICAID

## 2024-05-31 ENCOUNTER — TELEPHONE (OUTPATIENT)
Dept: OBSTETRICS AND GYNECOLOGY | Facility: CLINIC | Age: 29
End: 2024-05-31
Payer: MEDICAID

## 2024-05-31 NOTE — TELEPHONE ENCOUNTER
S/w pt who advised she needs to cancel her MFM US/MD visit as she is moving to a new city on next Monday. Stated primary OB provider, MICHI Godfrey is aware. Encouraged pt to let new care team know asap she was seeing MFM, and is due for a growth scan/fu consult asap at 30wks per recommendations. Pt verbalized understanding, expressed gratitude.

## 2024-06-04 ENCOUNTER — PATIENT MESSAGE (OUTPATIENT)
Dept: OTHER | Facility: OTHER | Age: 29
End: 2024-06-04
Payer: MEDICAID

## 2024-06-18 ENCOUNTER — TELEPHONE (OUTPATIENT)
Dept: OBSTETRICS AND GYNECOLOGY | Facility: CLINIC | Age: 29
End: 2024-06-18
Payer: MEDICAID

## 2024-06-18 NOTE — TELEPHONE ENCOUNTER
----- Message from Whitney Redmond sent at 6/18/2024  8:48 AM CDT -----  Who Called: Pt    What is the request in detail: Requesting call back to discuss sending [facility] [place]. Please advise    Can the clinic reply by [facility]? No    Best Call Back Number: [phone]      Additional Information:

## 2024-06-18 NOTE — TELEPHONE ENCOUNTER
Called patient and advised she does not have a LILIAN on file with us.  Patient stated she has already moved to [place].  Advised patient to sign LILIAN at new practice and have them fax it to our medical records department.  Phone and fax numbers for medical records given to patient.

## 2024-07-09 ENCOUNTER — PATIENT MESSAGE (OUTPATIENT)
Dept: OTHER | Facility: OTHER | Age: 29
End: 2024-07-09
Payer: MEDICAID

## 2024-07-22 ENCOUNTER — TELEPHONE (OUTPATIENT)
Dept: OBSTETRICS AND GYNECOLOGY | Facility: CLINIC | Age: 29
End: 2024-07-22
Payer: MEDICAID

## 2024-07-22 NOTE — TELEPHONE ENCOUNTER
----- Message from Kristen Forte sent at 7/22/2024 11:55 AM CDT -----  Regarding: patient advice  Contact: Angy  .Type:  Needs Medical Advice    Who Called: Angy  Symptoms (please be specific):    How long has patient had these symptoms:    Pharmacy name and phone #:    Would the patient rather a call back or a response via [facility]? call  Best Call Back Number: [phone]  Additional Information:   Angy is requesting her lab work and pap smear be sent to [facility] in [place].

## 2024-07-22 NOTE — TELEPHONE ENCOUNTER
Pap and recent Labs Faxed to [facility] . LILIAN located in media     Libertad JONES LPN  OB/GYN

## 2024-09-13 ENCOUNTER — PATIENT MESSAGE (OUTPATIENT)
Dept: RESEARCH | Facility: OTHER | Age: 29
End: 2024-09-13
Payer: MEDICAID

## 2024-09-18 ENCOUNTER — TELEPHONE (OUTPATIENT)
Dept: RESEARCH | Facility: HOSPITAL | Age: 29
End: 2024-09-18
Payer: MEDICAID

## 2024-09-18 NOTE — TELEPHONE ENCOUNTER
Sponsor: [facility]  Study Title: [facility] for Maternal Health Equity RP2: Phase I  IRB/Protocol #: 2023.280  Principle Investigator: Dr. Ana Lizarraga / Dr. Erin Caldwell     Patient was contacted by the clinical research coordinator, Isha, via telephone regarding eligibility for a research study. The patient answered and Isha relayed the following information:     The study aims to evaluate the [facility] digital medicine program, Connected MOM, which helps expecting mothers manage their pregnancy from home. It is conducted in partnership with [facility] and other community partners.     The patient was informed that the team seeks feedback on their participation or non-participation in Connected MOM, including their likes, dislikes, and suggestions for improvement. Participation in a focus group or one-on-one interviews is voluntary, will not affect their care, and has no associated costs.     The patient was encouraged to contact the study staff for more information or to participate. The patient is not interested and declined participation.

## 2024-10-21 ENCOUNTER — OFFICE VISIT (OUTPATIENT)
Dept: OBSTETRICS AND GYNECOLOGY | Facility: CLINIC | Age: 29
End: 2024-10-21
Payer: MEDICAID

## 2024-10-21 VITALS
SYSTOLIC BLOOD PRESSURE: 142 MMHG | WEIGHT: 226.44 LBS | BODY MASS INDEX: 40.12 KG/M2 | DIASTOLIC BLOOD PRESSURE: 90 MMHG | HEIGHT: 63 IN

## 2024-10-21 PROCEDURE — 99999 PR PBB SHADOW E&M-EST. PATIENT-LVL II: CPT | Mod: PBBFAC,,, | Performed by: OBSTETRICS & GYNECOLOGY

## 2024-10-21 PROCEDURE — 99212 OFFICE O/P EST SF 10 MIN: CPT | Mod: PBBFAC,PN | Performed by: OBSTETRICS & GYNECOLOGY

## 2024-10-21 PROCEDURE — 1159F MED LIST DOCD IN RCRD: CPT | Mod: CPTII,,, | Performed by: OBSTETRICS & GYNECOLOGY

## 2024-10-21 PROCEDURE — 3077F SYST BP >= 140 MM HG: CPT | Mod: CPTII,,, | Performed by: OBSTETRICS & GYNECOLOGY

## 2024-10-21 PROCEDURE — 3080F DIAST BP >= 90 MM HG: CPT | Mod: CPTII,,, | Performed by: OBSTETRICS & GYNECOLOGY

## 2024-10-21 RX ORDER — LABETALOL 100 MG/1
100 TABLET, FILM COATED ORAL EVERY 12 HOURS
Qty: 180 TABLET | Refills: 3 | Status: SHIPPED | OUTPATIENT
Start: 2024-10-21 | End: 2025-10-21

## 2024-10-21 NOTE — PROGRESS NOTES
Subjective:       Patient ID: Angy Bay is a 28 y.o. female.    Chief Complaint:  No chief complaint on file.      History of Present Illness  HPI  Here for problem  Had c/section with tubal in [facility] 24    Had abnormal ct scan and told to follow up--  Pt does not have ct report with her nor avail in care everywhere records    Bottle feeding    Has mental health appt at [facility]    Took last labetalol tablet yesterday evening      GYN & OB History  Patient's last menstrual period was 10/15/2024.   Date of Last Pap: 2021    OB History    Para Term  AB Living   3 3 3     3   SAB IAB Ectopic Multiple Live Births         0 3      # Outcome Date GA Lbr Sherif/2nd Weight Sex Type Anes PTL Lv   3 Term 24 37w2d    CS-LTranv   JUAN CARLOS   2 Term 18 39w3d / 01:54 2.75 kg (6 lb 1 oz) F Vag-Spont EPI N JUAN CARLOS   1 Term 16 41w0d  3.035 kg (6 lb 11.1 oz) F Vag-Vacuum EPI N JUAN CARLOS      Complications: Oligohydramnios       Review of Systems  Review of Systems   All other systems reviewed and are negative.          Objective:      Physical Exam:   Constitutional: She appears well-developed.     Eyes: Pupils are equal, round, and reactive to light. Conjunctivae and EOM are normal.      Pulmonary/Chest: Effort normal.        Abdominal: Soft.             Musculoskeletal: Normal range of motion.       Neurological: She is alert.    Skin: Skin is warm.    Psychiatric: She has a normal mood and affect.           Assessment:     Encounter Diagnosis   Name Primary?    Postpartum care following  delivery Yes      Plan:    Ct scan requested as well as op notes; postpartum discharge summary  Continue menstrual calendar  Continue pysch follow up  Cotninue labetalol 100mg bid

## 2024-10-25 ENCOUNTER — PATIENT MESSAGE (OUTPATIENT)
Dept: OBSTETRICS AND GYNECOLOGY | Facility: CLINIC | Age: 29
End: 2024-10-25
Payer: MEDICAID